# Patient Record
Sex: MALE | Race: WHITE | NOT HISPANIC OR LATINO | Employment: UNEMPLOYED | URBAN - METROPOLITAN AREA
[De-identification: names, ages, dates, MRNs, and addresses within clinical notes are randomized per-mention and may not be internally consistent; named-entity substitution may affect disease eponyms.]

---

## 2024-10-23 ENCOUNTER — HOSPITAL ENCOUNTER (INPATIENT)
Facility: HOSPITAL | Age: 69
LOS: 3 days | Discharge: HOME | DRG: 022 | End: 2024-10-27
Attending: STUDENT IN AN ORGANIZED HEALTH CARE EDUCATION/TRAINING PROGRAM | Admitting: NEUROLOGICAL SURGERY
Payer: MEDICARE

## 2024-10-23 ENCOUNTER — APPOINTMENT (OUTPATIENT)
Dept: CT IMAGING | Facility: HOSPITAL | Age: 69
DRG: 022 | End: 2024-10-23
Payer: MEDICARE

## 2024-10-23 ENCOUNTER — HOSPITAL ENCOUNTER (EMERGENCY)
Facility: HOSPITAL | Age: 69
Discharge: ANOTHER HOSPITAL | DRG: 022 | End: 2024-10-23
Payer: MEDICARE

## 2024-10-23 VITALS
SYSTOLIC BLOOD PRESSURE: 135 MMHG | RESPIRATION RATE: 16 BRPM | WEIGHT: 200 LBS | BODY MASS INDEX: 28 KG/M2 | DIASTOLIC BLOOD PRESSURE: 77 MMHG | HEART RATE: 59 BPM | OXYGEN SATURATION: 95 % | HEIGHT: 71 IN | TEMPERATURE: 97.3 F

## 2024-10-23 DIAGNOSIS — I62.00 SUBDURAL HEMORRHAGE: Primary | ICD-10-CM

## 2024-10-23 PROBLEM — I60.9 SUBARACHNOID HEMORRHAGE: Status: ACTIVE | Noted: 2024-10-23

## 2024-10-23 LAB
ALBUMIN SERPL-MCNC: 4.3 G/DL (ref 3.5–5.2)
ALBUMIN/GLOB SERPL: 1.5 G/DL
ALP SERPL-CCNC: 77 U/L (ref 39–117)
ALT SERPL W P-5'-P-CCNC: 28 U/L (ref 1–41)
ANION GAP SERPL CALCULATED.3IONS-SCNC: 16.2 MMOL/L (ref 5–15)
AST SERPL-CCNC: 24 U/L (ref 1–40)
BASOPHILS # BLD AUTO: 0.05 10*3/MM3 (ref 0–0.2)
BASOPHILS NFR BLD AUTO: 0.3 % (ref 0–1.5)
BILIRUB SERPL-MCNC: 0.5 MG/DL (ref 0–1.2)
BUN SERPL-MCNC: 19 MG/DL (ref 8–23)
BUN/CREAT SERPL: 14.8 (ref 7–25)
CALCIUM SPEC-SCNC: 9.9 MG/DL (ref 8.6–10.5)
CHLORIDE SERPL-SCNC: 104 MMOL/L (ref 98–107)
CO2 SERPL-SCNC: 20.8 MMOL/L (ref 22–29)
CREAT SERPL-MCNC: 1.28 MG/DL (ref 0.76–1.27)
DEPRECATED RDW RBC AUTO: 46.8 FL (ref 37–54)
EGFRCR SERPLBLD CKD-EPI 2021: 60.6 ML/MIN/1.73
EOSINOPHIL # BLD AUTO: 0 10*3/MM3 (ref 0–0.4)
EOSINOPHIL NFR BLD AUTO: 0 % (ref 0.3–6.2)
ERYTHROCYTE [DISTWIDTH] IN BLOOD BY AUTOMATED COUNT: 14.2 % (ref 12.3–15.4)
GEN 5 2HR TROPONIN T REFLEX: 12 NG/L
GLOBULIN UR ELPH-MCNC: 2.9 GM/DL
GLUCOSE BLDC GLUCOMTR-MCNC: 156 MG/DL (ref 70–130)
GLUCOSE SERPL-MCNC: 158 MG/DL (ref 65–99)
HCT VFR BLD AUTO: 45.6 % (ref 37.5–51)
HGB BLD-MCNC: 14.9 G/DL (ref 13–17.7)
IMM GRANULOCYTES # BLD AUTO: 0.1 10*3/MM3 (ref 0–0.05)
IMM GRANULOCYTES NFR BLD AUTO: 0.6 % (ref 0–0.5)
LYMPHOCYTES # BLD AUTO: 0.64 10*3/MM3 (ref 0.7–3.1)
LYMPHOCYTES NFR BLD AUTO: 3.9 % (ref 19.6–45.3)
MCH RBC QN AUTO: 29.4 PG (ref 26.6–33)
MCHC RBC AUTO-ENTMCNC: 32.7 G/DL (ref 31.5–35.7)
MCV RBC AUTO: 89.9 FL (ref 79–97)
MONOCYTES # BLD AUTO: 0.7 10*3/MM3 (ref 0.1–0.9)
MONOCYTES NFR BLD AUTO: 4.3 % (ref 5–12)
NEUTROPHILS NFR BLD AUTO: 14.89 10*3/MM3 (ref 1.7–7)
NEUTROPHILS NFR BLD AUTO: 90.9 % (ref 42.7–76)
NRBC BLD AUTO-RTO: 0 /100 WBC (ref 0–0.2)
PLATELET # BLD AUTO: 260 10*3/MM3 (ref 140–450)
PMV BLD AUTO: 11.9 FL (ref 6–12)
POTASSIUM SERPL-SCNC: 4.6 MMOL/L (ref 3.5–5.2)
PROT SERPL-MCNC: 7.2 G/DL (ref 6–8.5)
QT INTERVAL: 388 MS
QTC INTERVAL: 430 MS
RBC # BLD AUTO: 5.07 10*6/MM3 (ref 4.14–5.8)
SODIUM SERPL-SCNC: 141 MMOL/L (ref 136–145)
TROPONIN T DELTA: 5 NG/L
TROPONIN T SERPL HS-MCNC: 7 NG/L
WBC NRBC COR # BLD AUTO: 16.38 10*3/MM3 (ref 3.4–10.8)

## 2024-10-23 PROCEDURE — 70450 CT HEAD/BRAIN W/O DYE: CPT

## 2024-10-23 PROCEDURE — 84484 ASSAY OF TROPONIN QUANT: CPT | Performed by: STUDENT IN AN ORGANIZED HEALTH CARE EDUCATION/TRAINING PROGRAM

## 2024-10-23 PROCEDURE — G0378 HOSPITAL OBSERVATION PER HR: HCPCS

## 2024-10-23 PROCEDURE — 93005 ELECTROCARDIOGRAM TRACING: CPT | Performed by: STUDENT IN AN ORGANIZED HEALTH CARE EDUCATION/TRAINING PROGRAM

## 2024-10-23 PROCEDURE — 63710000001 DEXAMETHASONE PER 0.25 MG: Performed by: NEUROLOGICAL SURGERY

## 2024-10-23 PROCEDURE — 82948 REAGENT STRIP/BLOOD GLUCOSE: CPT

## 2024-10-23 PROCEDURE — 99285 EMERGENCY DEPT VISIT HI MDM: CPT

## 2024-10-23 PROCEDURE — 70486 CT MAXILLOFACIAL W/O DYE: CPT | Performed by: RADIOLOGY

## 2024-10-23 PROCEDURE — 80053 COMPREHEN METABOLIC PANEL: CPT

## 2024-10-23 PROCEDURE — 36415 COLL VENOUS BLD VENIPUNCTURE: CPT

## 2024-10-23 PROCEDURE — 85025 COMPLETE CBC W/AUTO DIFF WBC: CPT

## 2024-10-23 PROCEDURE — 99223 1ST HOSP IP/OBS HIGH 75: CPT

## 2024-10-23 PROCEDURE — 70450 CT HEAD/BRAIN W/O DYE: CPT | Performed by: RADIOLOGY

## 2024-10-23 PROCEDURE — 70486 CT MAXILLOFACIAL W/O DYE: CPT

## 2024-10-23 RX ORDER — LEVETIRACETAM 250 MG/1
500 TABLET ORAL 2 TIMES DAILY
Status: DISCONTINUED | OUTPATIENT
Start: 2024-10-23 | End: 2024-10-23 | Stop reason: HOSPADM

## 2024-10-23 RX ORDER — DEXAMETHASONE 4 MG/1
4 TABLET ORAL EVERY 6 HOURS SCHEDULED
Status: DISCONTINUED | OUTPATIENT
Start: 2024-10-23 | End: 2024-10-23 | Stop reason: HOSPADM

## 2024-10-23 RX ORDER — KETOROLAC TROMETHAMINE 30 MG/ML
15 INJECTION, SOLUTION INTRAMUSCULAR; INTRAVENOUS ONCE
Status: DISCONTINUED | OUTPATIENT
Start: 2024-10-23 | End: 2024-10-23

## 2024-10-23 RX ADMIN — LEVETIRACETAM 500 MG: 250 TABLET, FILM COATED ORAL at 20:08

## 2024-10-23 RX ADMIN — DEXAMETHASONE 4 MG: 4 TABLET ORAL at 20:08

## 2024-10-24 PROBLEM — I60.9 SUBARACHNOID HEMORRHAGE: Status: RESOLVED | Noted: 2024-10-23 | Resolved: 2024-10-24

## 2024-10-24 PROBLEM — R53.1 GENERALIZED WEAKNESS: Status: ACTIVE | Noted: 2024-10-24

## 2024-10-24 PROBLEM — I25.83 CORONARY ATHEROSCLEROSIS DUE TO LIPID RICH PLAQUE: Status: ACTIVE | Noted: 2024-10-24

## 2024-10-24 PROBLEM — I62.00 SUBDURAL HEMATOMA, NONTRAUMATIC: Status: ACTIVE | Noted: 2024-10-24

## 2024-10-24 PROBLEM — I60.9 SUBARACHNOID HEMORRHAGE: Status: ACTIVE | Noted: 2024-10-24

## 2024-10-24 PROBLEM — E78.5 HLD (HYPERLIPIDEMIA): Status: ACTIVE | Noted: 2024-10-24

## 2024-10-24 PROBLEM — E11.9 TYPE 2 DIABETES MELLITUS, WITHOUT LONG-TERM CURRENT USE OF INSULIN: Status: ACTIVE | Noted: 2024-10-24

## 2024-10-24 PROBLEM — I10 HTN (HYPERTENSION): Status: ACTIVE | Noted: 2024-10-24

## 2024-10-24 LAB
ALBUMIN SERPL-MCNC: 4.1 G/DL (ref 3.5–5.2)
ALBUMIN/GLOB SERPL: 1.5 G/DL
ALP SERPL-CCNC: 72 U/L (ref 39–117)
ALT SERPL W P-5'-P-CCNC: 25 U/L (ref 1–41)
ANION GAP SERPL CALCULATED.3IONS-SCNC: 13 MMOL/L (ref 5–15)
AST SERPL-CCNC: 22 U/L (ref 1–40)
BASOPHILS # BLD AUTO: 0.01 10*3/MM3 (ref 0–0.2)
BASOPHILS NFR BLD AUTO: 0.1 % (ref 0–1.5)
BILIRUB SERPL-MCNC: 0.6 MG/DL (ref 0–1.2)
BUN SERPL-MCNC: 23 MG/DL (ref 8–23)
BUN/CREAT SERPL: 20.2 (ref 7–25)
CALCIUM SPEC-SCNC: 10.4 MG/DL (ref 8.6–10.5)
CHLORIDE SERPL-SCNC: 104 MMOL/L (ref 98–107)
CO2 SERPL-SCNC: 24 MMOL/L (ref 22–29)
CREAT SERPL-MCNC: 1.14 MG/DL (ref 0.76–1.27)
DEPRECATED RDW RBC AUTO: 45.4 FL (ref 37–54)
EGFRCR SERPLBLD CKD-EPI 2021: 69.6 ML/MIN/1.73
EOSINOPHIL # BLD AUTO: 0 10*3/MM3 (ref 0–0.4)
EOSINOPHIL NFR BLD AUTO: 0 % (ref 0.3–6.2)
ERYTHROCYTE [DISTWIDTH] IN BLOOD BY AUTOMATED COUNT: 14.2 % (ref 12.3–15.4)
GLOBULIN UR ELPH-MCNC: 2.7 GM/DL
GLUCOSE BLDC GLUCOMTR-MCNC: 113 MG/DL (ref 70–130)
GLUCOSE BLDC GLUCOMTR-MCNC: 125 MG/DL (ref 70–130)
GLUCOSE BLDC GLUCOMTR-MCNC: 140 MG/DL (ref 70–130)
GLUCOSE BLDC GLUCOMTR-MCNC: 146 MG/DL (ref 70–130)
GLUCOSE SERPL-MCNC: 102 MG/DL (ref 65–99)
HBA1C MFR BLD: 5.7 % (ref 4.8–5.6)
HCT VFR BLD AUTO: 42.6 % (ref 37.5–51)
HGB BLD-MCNC: 14.6 G/DL (ref 13–17.7)
IMM GRANULOCYTES # BLD AUTO: 0.06 10*3/MM3 (ref 0–0.05)
IMM GRANULOCYTES NFR BLD AUTO: 0.5 % (ref 0–0.5)
INR PPP: 1.06 (ref 0.89–1.12)
LYMPHOCYTES # BLD AUTO: 1.14 10*3/MM3 (ref 0.7–3.1)
LYMPHOCYTES NFR BLD AUTO: 8.8 % (ref 19.6–45.3)
MCH RBC QN AUTO: 30 PG (ref 26.6–33)
MCHC RBC AUTO-ENTMCNC: 34.3 G/DL (ref 31.5–35.7)
MCV RBC AUTO: 87.5 FL (ref 79–97)
MONOCYTES # BLD AUTO: 0.67 10*3/MM3 (ref 0.1–0.9)
MONOCYTES NFR BLD AUTO: 5.2 % (ref 5–12)
NEUTROPHILS NFR BLD AUTO: 11.07 10*3/MM3 (ref 1.7–7)
NEUTROPHILS NFR BLD AUTO: 85.4 % (ref 42.7–76)
NRBC BLD AUTO-RTO: 0 /100 WBC (ref 0–0.2)
PLATELET # BLD AUTO: 259 10*3/MM3 (ref 140–450)
PMV BLD AUTO: 12 FL (ref 6–12)
POTASSIUM SERPL-SCNC: 5.1 MMOL/L (ref 3.5–5.2)
PROT SERPL-MCNC: 6.8 G/DL (ref 6–8.5)
PROTHROMBIN TIME: 13.9 SECONDS (ref 12.2–14.5)
RBC # BLD AUTO: 4.87 10*6/MM3 (ref 4.14–5.8)
SODIUM SERPL-SCNC: 141 MMOL/L (ref 136–145)
WBC NRBC COR # BLD AUTO: 12.95 10*3/MM3 (ref 3.4–10.8)

## 2024-10-24 PROCEDURE — C1760 CLOSURE DEV, VASC: HCPCS | Performed by: NEUROLOGICAL SURGERY

## 2024-10-24 PROCEDURE — 82948 REAGENT STRIP/BLOOD GLUCOSE: CPT

## 2024-10-24 PROCEDURE — C1769 GUIDE WIRE: HCPCS | Performed by: NEUROLOGICAL SURGERY

## 2024-10-24 PROCEDURE — 80053 COMPREHEN METABOLIC PANEL: CPT

## 2024-10-24 PROCEDURE — 99232 SBSQ HOSP IP/OBS MODERATE 35: CPT | Performed by: STUDENT IN AN ORGANIZED HEALTH CARE EDUCATION/TRAINING PROGRAM

## 2024-10-24 PROCEDURE — B31R1ZZ FLUOROSCOPY OF INTRACRANIAL ARTERIES USING LOW OSMOLAR CONTRAST: ICD-10-PCS | Performed by: NEUROLOGICAL SURGERY

## 2024-10-24 PROCEDURE — 03VG3DZ RESTRICTION OF INTRACRANIAL ARTERY WITH INTRALUMINAL DEVICE, PERCUTANEOUS APPROACH: ICD-10-PCS | Performed by: NEUROLOGICAL SURGERY

## 2024-10-24 PROCEDURE — 83036 HEMOGLOBIN GLYCOSYLATED A1C: CPT

## 2024-10-24 PROCEDURE — C1889 IMPLANT/INSERT DEVICE, NOC: HCPCS | Performed by: NEUROLOGICAL SURGERY

## 2024-10-24 PROCEDURE — C1887 CATHETER, GUIDING: HCPCS | Performed by: NEUROLOGICAL SURGERY

## 2024-10-24 PROCEDURE — 25010000002 LIDOCAINE PF 1% 1 % SOLUTION: Performed by: NEUROLOGICAL SURGERY

## 2024-10-24 PROCEDURE — B3121ZZ FLUOROSCOPY OF LEFT SUBCLAVIAN ARTERY USING LOW OSMOLAR CONTRAST: ICD-10-PCS | Performed by: NEUROLOGICAL SURGERY

## 2024-10-24 PROCEDURE — B3151ZZ FLUOROSCOPY OF BILATERAL COMMON CAROTID ARTERIES USING LOW OSMOLAR CONTRAST: ICD-10-PCS | Performed by: NEUROLOGICAL SURGERY

## 2024-10-24 PROCEDURE — 99152 MOD SED SAME PHYS/QHP 5/>YRS: CPT | Performed by: NEUROLOGICAL SURGERY

## 2024-10-24 PROCEDURE — 75894 X-RAYS TRANSCATH THERAPY: CPT | Performed by: NEUROLOGICAL SURGERY

## 2024-10-24 PROCEDURE — B3191ZZ FLUOROSCOPY OF RIGHT EXTERNAL CAROTID ARTERY USING LOW OSMOLAR CONTRAST: ICD-10-PCS | Performed by: NEUROLOGICAL SURGERY

## 2024-10-24 PROCEDURE — C1894 INTRO/SHEATH, NON-LASER: HCPCS | Performed by: NEUROLOGICAL SURGERY

## 2024-10-24 PROCEDURE — 61624 TCAT PERM OCCLS/EMBOLJ CNS: CPT | Performed by: NEUROLOGICAL SURGERY

## 2024-10-24 PROCEDURE — 25010000002 MIDAZOLAM PER 1 MG: Performed by: NEUROLOGICAL SURGERY

## 2024-10-24 PROCEDURE — 0 IODIXANOL PER 1 ML: Performed by: NEUROLOGICAL SURGERY

## 2024-10-24 PROCEDURE — 25010000002 FENTANYL CITRATE (PF) 50 MCG/ML SOLUTION: Performed by: NEUROLOGICAL SURGERY

## 2024-10-24 PROCEDURE — 85610 PROTHROMBIN TIME: CPT

## 2024-10-24 PROCEDURE — B3171ZZ FLUOROSCOPY OF LEFT INTERNAL CAROTID ARTERY USING LOW OSMOLAR CONTRAST: ICD-10-PCS | Performed by: NEUROLOGICAL SURGERY

## 2024-10-24 PROCEDURE — 99153 MOD SED SAME PHYS/QHP EA: CPT | Performed by: NEUROLOGICAL SURGERY

## 2024-10-24 PROCEDURE — 85025 COMPLETE CBC W/AUTO DIFF WBC: CPT

## 2024-10-24 PROCEDURE — 75898 FOLLOW-UP ANGIOGRAPHY: CPT | Performed by: NEUROLOGICAL SURGERY

## 2024-10-24 DEVICE — 360 NANO DETACHABLE COIL
Type: IMPLANTABLE DEVICE | Status: FUNCTIONAL
Brand: TARGET

## 2024-10-24 DEVICE — PARTICL EMB CONTRL PVA 250/355MH BX/5: Type: IMPLANTABLE DEVICE | Status: FUNCTIONAL

## 2024-10-24 RX ORDER — BISACODYL 5 MG/1
5 TABLET, DELAYED RELEASE ORAL DAILY PRN
Status: DISCONTINUED | OUTPATIENT
Start: 2024-10-24 | End: 2024-10-27 | Stop reason: HOSPADM

## 2024-10-24 RX ORDER — ONDANSETRON 2 MG/ML
4 INJECTION INTRAMUSCULAR; INTRAVENOUS EVERY 6 HOURS PRN
Status: DISCONTINUED | OUTPATIENT
Start: 2024-10-24 | End: 2024-10-27 | Stop reason: HOSPADM

## 2024-10-24 RX ORDER — LIDOCAINE HYDROCHLORIDE 10 MG/ML
INJECTION, SOLUTION EPIDURAL; INFILTRATION; INTRACAUDAL; PERINEURAL
Status: DISCONTINUED | OUTPATIENT
Start: 2024-10-24 | End: 2024-10-24 | Stop reason: HOSPADM

## 2024-10-24 RX ORDER — SODIUM CHLORIDE 9 MG/ML
75 INJECTION, SOLUTION INTRAVENOUS CONTINUOUS
Status: ACTIVE | OUTPATIENT
Start: 2024-10-24 | End: 2024-10-24

## 2024-10-24 RX ORDER — ISOSORBIDE MONONITRATE 30 MG/1
30 TABLET, EXTENDED RELEASE ORAL DAILY
Status: DISCONTINUED | OUTPATIENT
Start: 2024-10-24 | End: 2024-10-27 | Stop reason: HOSPADM

## 2024-10-24 RX ORDER — NITROGLYCERIN 0.4 MG/1
0.4 TABLET SUBLINGUAL
Status: DISCONTINUED | OUTPATIENT
Start: 2024-10-24 | End: 2024-10-27 | Stop reason: HOSPADM

## 2024-10-24 RX ORDER — LISINOPRIL 5 MG/1
5 TABLET ORAL DAILY
Status: DISCONTINUED | OUTPATIENT
Start: 2024-10-24 | End: 2024-10-24

## 2024-10-24 RX ORDER — LISINOPRIL 5 MG/1
2.5 TABLET ORAL DAILY
COMMUNITY
End: 2024-10-27 | Stop reason: HOSPADM

## 2024-10-24 RX ORDER — CLOPIDOGREL BISULFATE 75 MG/1
75 TABLET ORAL DAILY
COMMUNITY
End: 2024-10-27 | Stop reason: HOSPADM

## 2024-10-24 RX ORDER — AMLODIPINE BESYLATE 10 MG/1
10 TABLET ORAL DAILY
Status: DISCONTINUED | OUTPATIENT
Start: 2024-10-24 | End: 2024-10-27 | Stop reason: HOSPADM

## 2024-10-24 RX ORDER — LISINOPRIL 5 MG/1
2.5 TABLET ORAL DAILY
Status: DISCONTINUED | OUTPATIENT
Start: 2024-10-24 | End: 2024-10-24

## 2024-10-24 RX ORDER — ACETAMINOPHEN 325 MG/1
650 TABLET ORAL EVERY 4 HOURS PRN
Status: DISCONTINUED | OUTPATIENT
Start: 2024-10-24 | End: 2024-10-24 | Stop reason: SDUPTHER

## 2024-10-24 RX ORDER — SODIUM CHLORIDE 0.9 % (FLUSH) 0.9 %
10 SYRINGE (ML) INJECTION AS NEEDED
Status: DISCONTINUED | OUTPATIENT
Start: 2024-10-24 | End: 2024-10-27 | Stop reason: HOSPADM

## 2024-10-24 RX ORDER — ACETAMINOPHEN 160 MG/5ML
650 SOLUTION ORAL EVERY 4 HOURS PRN
Status: DISCONTINUED | OUTPATIENT
Start: 2024-10-24 | End: 2024-10-24 | Stop reason: SDUPTHER

## 2024-10-24 RX ORDER — ATORVASTATIN CALCIUM 40 MG/1
80 TABLET, FILM COATED ORAL DAILY
Status: DISCONTINUED | OUTPATIENT
Start: 2024-10-24 | End: 2024-10-27 | Stop reason: HOSPADM

## 2024-10-24 RX ORDER — IBUPROFEN 600 MG/1
1 TABLET ORAL
Status: DISCONTINUED | OUTPATIENT
Start: 2024-10-24 | End: 2024-10-27 | Stop reason: HOSPADM

## 2024-10-24 RX ORDER — POLYETHYLENE GLYCOL 3350 17 G/17G
17 POWDER, FOR SOLUTION ORAL DAILY PRN
Status: DISCONTINUED | OUTPATIENT
Start: 2024-10-24 | End: 2024-10-27 | Stop reason: HOSPADM

## 2024-10-24 RX ORDER — MIDAZOLAM HYDROCHLORIDE 1 MG/ML
INJECTION, SOLUTION INTRAMUSCULAR; INTRAVENOUS
Status: DISCONTINUED | OUTPATIENT
Start: 2024-10-24 | End: 2024-10-24 | Stop reason: HOSPADM

## 2024-10-24 RX ORDER — FLUTICASONE PROPIONATE 50 MCG
2 SPRAY, SUSPENSION (ML) NASAL DAILY
COMMUNITY

## 2024-10-24 RX ORDER — SODIUM CHLORIDE 0.9 % (FLUSH) 0.9 %
10 SYRINGE (ML) INJECTION EVERY 12 HOURS SCHEDULED
Status: DISCONTINUED | OUTPATIENT
Start: 2024-10-24 | End: 2024-10-27 | Stop reason: HOSPADM

## 2024-10-24 RX ORDER — AMLODIPINE BESYLATE 10 MG/1
10 TABLET ORAL DAILY
COMMUNITY
End: 2024-11-08 | Stop reason: HOSPADM

## 2024-10-24 RX ORDER — ATORVASTATIN CALCIUM 20 MG/1
20 TABLET, FILM COATED ORAL DAILY
Status: DISCONTINUED | OUTPATIENT
Start: 2024-10-24 | End: 2024-10-24

## 2024-10-24 RX ORDER — LOSARTAN POTASSIUM 50 MG/1
50 TABLET ORAL DAILY
Status: ON HOLD | COMMUNITY
End: 2024-10-24

## 2024-10-24 RX ORDER — FENTANYL CITRATE 50 UG/ML
INJECTION, SOLUTION INTRAMUSCULAR; INTRAVENOUS
Status: DISCONTINUED | OUTPATIENT
Start: 2024-10-24 | End: 2024-10-24 | Stop reason: HOSPADM

## 2024-10-24 RX ORDER — CLOPIDOGREL BISULFATE 75 MG/1
75 TABLET ORAL DAILY
Status: DISCONTINUED | OUTPATIENT
Start: 2024-10-24 | End: 2024-10-26

## 2024-10-24 RX ORDER — NICOTINE POLACRILEX 4 MG
15 LOZENGE BUCCAL
Status: DISCONTINUED | OUTPATIENT
Start: 2024-10-24 | End: 2024-10-27 | Stop reason: HOSPADM

## 2024-10-24 RX ORDER — ACETAMINOPHEN 650 MG/1
650 SUPPOSITORY RECTAL EVERY 4 HOURS PRN
Status: DISCONTINUED | OUTPATIENT
Start: 2024-10-24 | End: 2024-10-24 | Stop reason: SDUPTHER

## 2024-10-24 RX ORDER — ASPIRIN 81 MG/1
81 TABLET ORAL DAILY
Status: DISCONTINUED | OUTPATIENT
Start: 2024-10-24 | End: 2024-10-26

## 2024-10-24 RX ORDER — AMOXICILLIN 250 MG
2 CAPSULE ORAL 2 TIMES DAILY PRN
Status: DISCONTINUED | OUTPATIENT
Start: 2024-10-24 | End: 2024-10-27 | Stop reason: HOSPADM

## 2024-10-24 RX ORDER — LANOLIN ALCOHOL/MO/W.PET/CERES
1000 CREAM (GRAM) TOPICAL DAILY
COMMUNITY

## 2024-10-24 RX ORDER — DEXTROSE MONOHYDRATE 25 G/50ML
25 INJECTION, SOLUTION INTRAVENOUS
Status: DISCONTINUED | OUTPATIENT
Start: 2024-10-24 | End: 2024-10-27 | Stop reason: HOSPADM

## 2024-10-24 RX ORDER — FOLIC ACID 1 MG/1
1 TABLET ORAL DAILY
COMMUNITY

## 2024-10-24 RX ORDER — ISOSORBIDE MONONITRATE 30 MG/1
30 TABLET, EXTENDED RELEASE ORAL DAILY
COMMUNITY

## 2024-10-24 RX ORDER — BISACODYL 10 MG
10 SUPPOSITORY, RECTAL RECTAL DAILY PRN
Status: DISCONTINUED | OUTPATIENT
Start: 2024-10-24 | End: 2024-10-27 | Stop reason: HOSPADM

## 2024-10-24 RX ORDER — ONDANSETRON 4 MG/1
4 TABLET, ORALLY DISINTEGRATING ORAL EVERY 6 HOURS PRN
Status: DISCONTINUED | OUTPATIENT
Start: 2024-10-24 | End: 2024-10-27 | Stop reason: HOSPADM

## 2024-10-24 RX ORDER — LISINOPRIL 5 MG/1
5 TABLET ORAL DAILY
Status: DISCONTINUED | OUTPATIENT
Start: 2024-10-24 | End: 2024-10-27 | Stop reason: HOSPADM

## 2024-10-24 RX ORDER — IODIXANOL 320 MG/ML
INJECTION, SOLUTION INTRAVASCULAR
Status: DISCONTINUED | OUTPATIENT
Start: 2024-10-24 | End: 2024-10-24 | Stop reason: HOSPADM

## 2024-10-24 RX ORDER — DAPAGLIFLOZIN 10 MG/1
10 TABLET, FILM COATED ORAL DAILY
COMMUNITY

## 2024-10-24 RX ORDER — ACETAMINOPHEN 500 MG
1000 TABLET ORAL 3 TIMES DAILY
Status: DISPENSED | OUTPATIENT
Start: 2024-10-24 | End: 2024-10-26

## 2024-10-24 RX ORDER — ASPIRIN 81 MG/1
81 TABLET ORAL DAILY
Status: ON HOLD | COMMUNITY
End: 2024-10-27

## 2024-10-24 RX ORDER — ATORVASTATIN CALCIUM 20 MG/1
80 TABLET, FILM COATED ORAL DAILY
COMMUNITY
End: 2024-11-08 | Stop reason: HOSPADM

## 2024-10-24 RX ORDER — LOSARTAN POTASSIUM 50 MG/1
50 TABLET ORAL
Status: DISCONTINUED | OUTPATIENT
Start: 2024-10-24 | End: 2024-10-24

## 2024-10-24 RX ADMIN — ACETAMINOPHEN 1000 MG: 500 TABLET ORAL at 17:27

## 2024-10-24 RX ADMIN — ACETAMINOPHEN 1000 MG: 500 TABLET ORAL at 21:13

## 2024-10-24 RX ADMIN — Medication 10 ML: at 10:05

## 2024-10-24 NOTE — ED NOTES
Call report to [phone] [facility]. When report is given they will give the bed number. If pt comes by EMS tell them to go to the [facility].

## 2024-10-24 NOTE — PLAN OF CARE
Goal Outcome Evaluation:         Patient Transfered to floor from PACU post MMA embolization, right groin site soft, with gauze and tegaderm no sign of bleeding, VSS, room air, family at bedside. Schedule pain med admin, only complain of slight pain when sneezing. Call light within reach.

## 2024-10-24 NOTE — PROGRESS NOTES
This is a 69-year-old man with a very large right sided subdural and a smaller left sided convexity subdural who presented with intermittent headaches over the last 3 weeks.  He went to [facility] and was evaluated with a head CT.  This demonstrated the aforementioned findings.    Informed consent for diagnostic angiogram with planned bilateral middle meningeal artery embolizations was obtained from the patient.  His niece and wife were at the bedside.  All questions were answered.  No guarantees were given or implied.  The patient consents to proceed with surgery.    Formal consultation to follow.

## 2024-10-24 NOTE — CASE MANAGEMENT/SOCIAL WORK
Discharge Planning Assessment  [facility]     Patient Name: Manjit Thomas  MRN: [id number]  Today's Date: 10/24/2024    Admit Date: 10/23/2024    Plan: Home   Discharge Needs Assessment       Row Name 10/24/24 1135       Living Environment    People in Home spouse    Current Living Arrangements home    Primary Care Provided by self    Provides Primary Care For no one    Family Caregiver if Needed spouse    Family Caregiver Names Yonny Thomas    Quality of Family Relationships supportive    Able to Return to Prior Arrangements yes       Transition Planning    Patient/Family Anticipates Transition to home with family    Transportation Anticipated family or friend will provide       Discharge Needs Assessment    Readmission Within the Last 30 Days no previous admission in last 30 days    Equipment Currently Used at Home none                   Discharge Plan       Row Name 10/24/24 1143       Plan    Plan Home    Patient/Family in Agreement with Plan yes    Plan Comments Spoke with Mr. Thomas and family at the bedside. He lives with his Spouse in [facility]. He is independent with ADL's and he does not use any DME or Home Health. He does not have advance directives. His PCP is Ariel Lee and He has [employer] Medicare and [place] Medicaid insurance. Discharge plan is home. CM will continue to follow for discharge needs.    Final Discharge Disposition Code 01 - home or self-care                  Continued Care and Services - Admitted Since 10/23/2024    No active coordination exists for this encounter.          Demographic Summary       Row Name 10/24/24 1129       General Information    Admission Type inpatient    Arrived From home    Referral Source admission list    Reason for Consult discharge planning    Preferred Language English       Contact Information    Permission Granted to Share Info With                    Functional Status       Row Name 10/24/24 1132       Functional Status, IADL     Medications independent    Meal Preparation independent    Housekeeping independent    Laundry independent    Shopping independent       Mental Status    General Appearance WDL WDL                   Psychosocial    No documentation.                  Abuse/Neglect    No documentation.                  Legal    No documentation.                  Substance Abuse    No documentation.                  Patient Forms    No documentation.                     Janina Moore RN

## 2024-10-24 NOTE — PROGRESS NOTES
[facility] Medicine Services  PROGRESS NOTE    Patient Name: Manjit Thomas  : 1955  MRN: [id number]    Date of Admission: 10/23/2024  Primary Care Physician: Ariel Lee DO    Subjective   Subjective     CC:  Bilateral subdural hematoma    HPI:  Patient seen resting comfortably in bed no acute distress.  Patient states he been having headaches for the past 3 weeks, exacerbated by laying down, alleviated with sitting up.  Patient denied any syncope, or loss of consciousness.  Patient states he had multiple episodes of nausea and vomiting yesterday that prompted him to be evaluated at the ED.  Currently.  Patient denies any headaches, nausea or vomiting.      Objective   Objective     Vital Signs:   Temp:  [97.3 °F (36.3 °C)-98.4 °F (36.9 °C)] 97.9 °F (36.6 °C)  Heart Rate:  [50-81] 63  Resp:  [16-18] 16  BP: (103-182)/(68-95) 182/92  Flow (L/min) (Oxygen Therapy):  [2] 2     Physical Exam  Constitutional:       General: He is not in acute distress.  Eyes:      General: No scleral icterus.  Cardiovascular:      Rate and Rhythm: Normal rate.      Pulses: Normal pulses.   Pulmonary:      Effort: Pulmonary effort is normal. No respiratory distress.   Abdominal:      General: There is no distension.      Palpations: Abdomen is soft.      Tenderness: There is no abdominal tenderness. There is no guarding or rebound.   Musculoskeletal:      Right lower leg: No edema.      Left lower leg: No edema.   Neurological:      Mental Status: He is alert and oriented to person, place, and time.      Cranial Nerves: No cranial nerve deficit.      Sensory: No sensory deficit.      Motor: No weakness.   Psychiatric:         Mood and Affect: Mood normal.         Behavior: Behavior normal.          Results Reviewed:  LAB RESULTS:      Lab 10/24/24  0808 10/24/24  0807 10/23/24  2012 10/23/24  1853 10/23/24  1746 10/18/24  0916   WBC  --  12.95*  --  16.38*  --  7.7   HEMOGLOBIN  --  14.6  --   14.9  --  15.7   HEMATOCRIT  --  42.6  --  45.6  --  48.3   PLATELETS  --  259  --  260  --  260   NEUTROS ABS  --  11.07*  --  14.89*  --  5.0   IMMATURE GRANS (ABS)  --  0.06*  --  0.10*  --   --    LYMPHS ABS  --  1.14  --  0.64*  --  1.7   MONOS ABS  --  0.67  --  0.70  --  0.8   EOS ABS  --  0.00  --  0.00  --  0.2   MCV  --  87.5  --  89.9  --  91   PROTIME 13.9  --   --   --   --   --    HSTROP T  --   --  12  --  7  --          Lab 10/24/24  0808 10/23/24  1746   SODIUM 141 141   POTASSIUM 5.1 4.6   CHLORIDE 104 104   CO2 24.0 20.8*   ANION GAP 13.0 16.2*   BUN 23 19   CREATININE 1.14 1.28*   EGFR 69.6 60.6   GLUCOSE 102* 158*   CALCIUM 10.4 9.9   HEMOGLOBIN A1C 5.70*  --          Lab 10/24/24  0808 10/23/24  1746   TOTAL PROTEIN 6.8 7.2   ALBUMIN 4.1 4.3   GLOBULIN 2.7 2.9   ALT (SGPT) 25 28   AST (SGOT) 22 24   BILIRUBIN 0.6 0.5   ALK PHOS 72 77         Lab 10/24/24  0808 10/23/24  2012 10/23/24  1746   HSTROP T  --  12 7   PROTIME 13.9  --   --    INR 1.06  --   --                  Brief Urine Lab Results       None            Microbiology Results Abnormal       None            CT Head Without Contrast    Result Date: 10/23/2024  INDICATION: Headache.  COMPARISON: No relevant priors available  TECHNIQUE: Axial CT images of the head and sinuses were obtained without IV contrast. Coronal and sagittal reformations were reviewed.  FINDINGS: Acute on chronic right subdural hematoma measuring 1.7 cm maximum thickness. Acute on chronic left-sided subdural hematoma measuring up to 0.5 cm. Right-to-left midline shift 1 cm.  Gray-white differentiation is relatively preserved. No evidence of acute large territorial infarction. Ventricles appear normal in size. Basal cisterns are patent. No depressed calvarial fracture.  Paranasal sinuses are well aerated. No air-fluid level. Rightward nasal septum deviation. Orbits and globes appear unremarkable.      Impression: 1. Acute on chronic right subdural hematoma measuring  1.7 cm maximum thickness. Acute on chronic left-sided subdural hematoma measuring up to 0.5 cm. Right-to-left midline shift 1 cm. 2. Sinuses appear unremarkable.  Report called to Dr. Serrano at 347PM EST 10/23/2024.   This report was finalized on 10/23/2024 6:50 PM by Alex Pallas, DO.      CT Sinus Without Contrast    Result Date: 10/23/2024  INDICATION: Headache.  COMPARISON: No relevant priors available  TECHNIQUE: Axial CT images of the head and sinuses were obtained without IV contrast. Coronal and sagittal reformations were reviewed.  FINDINGS: Acute on chronic right subdural hematoma measuring 1.7 cm maximum thickness. Acute on chronic left-sided subdural hematoma measuring up to 0.5 cm. Right-to-left midline shift 1 cm.  Gray-white differentiation is relatively preserved. No evidence of acute large territorial infarction. Ventricles appear normal in size. Basal cisterns are patent. No depressed calvarial fracture.  Paranasal sinuses are well aerated. No air-fluid level. Rightward nasal septum deviation. Orbits and globes appear unremarkable.      Impression: 1. Acute on chronic right subdural hematoma measuring 1.7 cm maximum thickness. Acute on chronic left-sided subdural hematoma measuring up to 0.5 cm. Right-to-left midline shift 1 cm. 2. Sinuses appear unremarkable.  Report called to Dr. Serrano at 347PM EST 10/23/2024.   This report was finalized on 10/23/2024 6:50 PM by Alex Pallas, DO.           Current medications:  Scheduled Meds:acetaminophen, 1,000 mg, Oral, TID  amLODIPine, 10 mg, Oral, Daily  aspirin, 81 mg, Oral, Daily  [Transfer Hold] atorvastatin, 80 mg, Oral, Daily  [Held by provider] clopidogrel, 75 mg, Oral, Daily  insulin regular, 2-7 Units, Subcutaneous, Q6H  isosorbide mononitrate, 30 mg, Oral, Daily  lisinopril, 5 mg, Oral, Daily  sodium chloride, 10 mL, Intravenous, Q12H      Continuous Infusions:   PRN Meds:.  senna-docusate sodium **AND** polyethylene glycol **AND** bisacodyl  **AND** bisacodyl    Calcium Replacement - Follow Nurse / BPA Driven Protocol    dextrose    dextrose    fentaNYL citrate (PF)    glucagon (human recombinant)    lidocaine PF 1%    Magnesium Standard Dose Replacement - Follow Nurse / BPA Driven Protocol    melatonin    midazolam    ondansetron ODT **OR** ondansetron    Phosphorus Replacement - Follow Nurse / BPA Driven Protocol    Potassium Replacement - Follow Nurse / BPA Driven Protocol    sodium chloride    Assessment & Plan   Assessment & Plan     Active Hospital Problems    Diagnosis  POA    **Subdural hematoma, nontraumatic [I62.00]  Yes    Type 2 diabetes mellitus, without long-term current use of insulin [E11.9]  Unknown    HLD (hyperlipidemia) [E78.5]  Unknown    HTN (hypertension) [I10]  Unknown    Coronary atherosclerosis due to lipid rich plaque [I25.83]  Unknown    Generalized weakness [R53.1]  Unknown    Subarachnoid hemorrhage [I60.9]  Yes      Resolved Hospital Problems   No resolved problems to display.        Brief Hospital Course to date:  Manjit Thomas is a 69 y.o. male  with a PMHx HTN, HLD, coronary atherosclerosis, s/p angioplasty with stent on Plavix, nephrolithiasis, and vitamin B12 deficiency presenting with acute on chronic bilateral subdural hematoma.     Headache  Acute on chronic bilateral subdural hematoma  - 3 weeks of headache, 1 day of nausea/vomiting. Denies any trauma/falls.   - CT head: Acute on chronic right subdural hematoma measuring 1.7 cm maximum  thickness. Acute on chronic left-sided subdural hematoma measuring up to 0.5 cm. Right-to-left midline shift 1 cm.  PLAN  - NSGY consulted  - Planning for diagnostic angiogram with planned bilateral middle meningeal artery embolizations today  - Will remain NPO  - Neuro checks   - Monitor Hgb with CBC     Generalized weakness  -Fall risk  -PT/ OT consulted     HTN  Coronary atherosclerosis  -Troponin initial 7, repeat 12  -EKG sinus rhythm with PVCs  -Patient denies chest  pain  PLAN  -Continue aspirin  -Holding Plavix in setting of subdural hematomas   -Holding isosorbide, lisinopril, and amlodipine until post-op    HLD  -Holding atorvastatin until post-op     Type 2 diabetes without long-term insulin use  -Patient on Farxiga  -A1c 5.70  -FSBG, SSI.    Expected Discharge Location and Transportation: TBD  Expected Discharge   Expected discharge date/ time has not been documented.     VTE Prophylaxis:  Mechanical VTE prophylaxis orders are present.         AM-PAC 6 Clicks Score (PT): 18 (10/23/24 6064)    CODE STATUS:   Code Status and Medical Interventions: CPR (Attempt to Resuscitate); Full Support   Ordered at: 10/24/24 0054     Level Of Support Discussed With:    Patient     Code Status (Patient has no pulse and is not breathing):    CPR (Attempt to Resuscitate)     Medical Interventions (Patient has pulse or is breathing):    Full Support       Cristo Robert DO  10/24/24

## 2024-10-24 NOTE — CONSULTS
Reason for consultation: Bilateral subdural hematoma    Referring Physician:  David Serrano DO     Chief complaint Headache, generalized weakness     Admit Diagnosis:   Subarachnoid hemorrhage [I60.9]     History of present illness:  This is a 69-year-old male who presented to [facility] yesterday with an approximately 3-week history of progressively worsening headaches.  Head CT at [facility] demonstrated a very large right-sided subdural and a smaller left-sided convexity subdural hematoma.  He was ultimately transferred to [facility] for higher level care and admitted to hospital medicine.  Neurosurgical consultation was ultimately requested.  He is currently on Plavix due to history of angioplasty with stent placements.  Patient was seen and examined at the bedside this morning.  He denies headache this morning and states that he is feeling better.  He denies any numbness/tingling or other neurological symptoms.  His wife and niece at the bedside this morning.    ROS:  A 12 point review of systems was performed.  All systems reviewed were negative with the exception of those mentioned in the history of present illness.    Past medical history:  Past Medical History:   Diagnosis Date    Coronary atherosclerosis     Diabetes mellitus     Headache     HLD (hyperlipidemia)     Hypertension     Vitamin B12 deficiency        Past surgical history:  Past Surgical History:   Procedure Laterality Date    CORONARY ANGIOPLASTY WITH STENT PLACEMENT N/A        Social history:  Social History     Socioeconomic History    Marital status:    Tobacco Use    Smoking status: Never    Smokeless tobacco: Never   Vaping Use    Vaping status: Never Used   Substance and Sexual Activity    Alcohol use: Never    Drug use: Never    Sexual activity: Defer       Family history:  Family History   Problem Relation Age of Onset    Heart attack Father     Coronary artery disease Brother     Other  (CABG) Brother        Allergies:  Allergies   Allergen Reactions    Codeine Nausea Only     Other Reaction(s) from [facility]: Nausea    Phenobarbital Itching       Outpatient medications:  Scheduled Meds:acetaminophen, 1,000 mg, Oral, TID  amLODIPine, 10 mg, Oral, Daily  aspirin, 81 mg, Oral, Daily  atorvastatin, 80 mg, Oral, Daily  [Held by provider] clopidogrel, 75 mg, Oral, Daily  insulin regular, 2-7 Units, Subcutaneous, Q6H  isosorbide mononitrate, 30 mg, Oral, Daily  lisinopril, 5 mg, Oral, Daily  sodium chloride, 10 mL, Intravenous, Q12H      Continuous Infusions:   PRN Meds:.  senna-docusate sodium **AND** polyethylene glycol **AND** bisacodyl **AND** bisacodyl    Calcium Replacement - Follow Nurse / BPA Driven Protocol    dextrose    dextrose    glucagon (human recombinant)    Magnesium Standard Dose Replacement - Follow Nurse / BPA Driven Protocol    melatonin    ondansetron ODT **OR** ondansetron    Phosphorus Replacement - Follow Nurse / BPA Driven Protocol    Potassium Replacement - Follow Nurse / BPA Driven Protocol    sodium chloride    Physical Examination:  General:  Vitals:    10/24/24 0738   BP: 142/77   Pulse: 51   Resp: 18   Temp: 98.1 °F (36.7 °C)   SpO2: 91%     Systolic (24hrs), Av , Min:103 , Max:144     Diastolic (24hrs), Av, Min:68, Max:95    Pulse  Av.3  Min: 50  Max: 81    Temp (24hrs), Av °F (36.7 °C), Min:97.3 °F (36.3 °C), Max:98.4 °F (36.9 °C)      Neurological: Awake, alert, and oriented. Cranial nerves II through XII are grossly intact.  There is no pronator drift.  Finger to nose testing is performed without dysmetria or bradykinesia.  Sensation is intact to light touch throughout.  Musculoskeletal: Good strength both proximally and distally in all 4 extremities.  Patient is able to move all 4 extremities.  Vascular: 2+ radial pulses bilaterally.  Cardiovascular: Regular rate and rhythm.  Extremities: No clubbing, cyanosis, or edema.  Pulmonary: Normal effort  and excursion.  No evidence of respiratory distress.  Psychiatric: Normal mood and affect  HEENT: Normocephalic, atraumatic.  Eyes: No scleral icterus.  Extraocular eye movements are intact, and pupils are equal, round, and reactive to light.    Imaging:  Today I have for my review a CT scan of the head that was completed yesterday on 10/23/2024 that reveals what appears to be an acute on chronic right-sided subdural hematoma measuring approximately 1.7 cm.  There is also an acute on chronic left-sided subdural hematoma that is smaller in size.  There is a 1 cm right-to-left midline shift.      Assessment and Plan:    Subarachnoid hemorrhage [I60.9]     I started the patient on Keppra and Decadron yesterday in which she can continue.  Continue to hold Plavix.  Also placed him on n.p.o. overnight.  Dr. Jenkins and I both spoke to the patient and his family at the bedside this morning about moving forward with a diagnostic angiogram and planned bilateral middle meningeal artery embolizations.  All questions were answered and the patient consents to move forward with this procedure.  We have added him to Dr. Jenkins schedule this morning in the Cath Lab. Neurosurgery will continue to follow, appreciate the consult.

## 2024-10-24 NOTE — H&P
[facility] Medicine Services  HISTORY AND PHYSICAL    Patient Name: Manjit Thomas  : 1955  MRN: [id number]  Primary Care Physician: Ariel Lee DO  Date of admission: 10/23/2024    Subjective   Subjective     Chief Complaint:  Headache    HPI:  Manjit Thomas is a 69 y.o. male with significant medical history of HTN, HLD, coronary atherosclerosis, s/p angioplasty with stent on Plavix, nephrolithiasis, and vitamin B12 deficiency presents to [facility] as a transfer from [facility] after CT reveals acute on chronic subdermal hematoma, requiring a higher level of care.      Patient states he has had headache for approximately 3 weeks.  Pain increases with standing, laying down, coughing or sneezing.  Patient describes intermittent episodes of weakness, numbness, and tingling of his lower extremities.  He sought medical assistance at [facility] due to progressively worsening headache.  He did have an episode of nausea vomiting today during transport from [place] to [place], now resolved.  He denies photophobia or visual disturbances.  He denies difficulty swallowing or slurred speech.  He denies head injury or trauma.  Patient is alert and oriented x 4 follows commands.  He denies chest pain or shortness of breath.  No reported fevers chills or diarrhea.  He is resting comfortably in bed with no complaints currently.      Review of Systems   Constitutional:  Negative for activity change, chills, fatigue and fever.   Eyes:  Negative for photophobia and visual disturbance.   Respiratory:  Negative for cough, chest tightness and shortness of breath.    Cardiovascular:  Negative for chest pain and leg swelling.   Gastrointestinal:  Positive for nausea and vomiting. Negative for abdominal distention and diarrhea.   Endocrine: Negative.    Genitourinary:  Negative for difficulty urinating.   Musculoskeletal: Negative.    Skin: Negative.    Allergic/Immunologic:  Negative.    Neurological:  Positive for weakness, numbness and headaches (Intermittent headaches for 3 weeks). Negative for dizziness, seizures and speech difficulty.   Hematological: Negative.    Psychiatric/Behavioral: Negative.                Personal History     Past Medical History:   Diagnosis Date    Coronary atherosclerosis     Diabetes mellitus     Headache     HLD (hyperlipidemia)     Hypertension     Vitamin B12 deficiency              Past Surgical History:   Procedure Laterality Date    CORONARY ANGIOPLASTY WITH STENT PLACEMENT N/A        Family History: family history includes CABG in his brother; Coronary artery disease in his brother; Heart attack in his father.     Social History:  reports that he has never smoked. He has never used smokeless tobacco. He reports that he does not drink alcohol and does not use drugs.  Social History     Social History Narrative    Not on file       Medications:  amLODIPine, aspirin, atorvastatin, clopidogrel, dapagliflozin Propanediol, fluticasone, folic acid, isosorbide mononitrate, lisinopril, and vitamin B-12    Allergies   Allergen Reactions    Codeine Nausea Only     Other Reaction(s) from [facility]: Nausea    Phenobarbital Itching       Objective   Objective     Vital Signs:   Temp:  [97.3 °F (36.3 °C)-98.4 °F (36.9 °C)] 98.4 °F (36.9 °C)  Heart Rate:  [50-81] 54  Resp:  [16] 16  BP: (103-143)/(68-95) 130/79    Physical Exam  Constitutional:       Appearance: Normal appearance.   HENT:      Head: Normocephalic and atraumatic.   Eyes:      Extraocular Movements: Extraocular movements intact.      Pupils: Pupils are equal, round, and reactive to light.   Cardiovascular:      Rate and Rhythm: Normal rate and regular rhythm.   Pulmonary:      Effort: Pulmonary effort is normal.      Breath sounds: Normal breath sounds.   Abdominal:      General: Bowel sounds are normal.      Palpations: Abdomen is soft.      Tenderness: There is no abdominal tenderness.    Musculoskeletal:      Right lower leg: No edema.      Left lower leg: No edema.   Skin:     General: Skin is warm and dry.   Neurological:      General: No focal deficit present.      Mental Status: He is alert and oriented to person, place, and time.   Psychiatric:         Mood and Affect: Mood normal.         Behavior: Behavior normal.          Result Review:  I have personally reviewed the results from the time of this admission to 10/24/2024 00:53 EDT and agree with these findings:  [x]  Laboratory list / accordion  []  Microbiology  [x]  Radiology  [x]  EKG/Telemetry   []  Cardiology/Vascular   []  Pathology  [x]  Old records  []  Other:      LAB RESULTS:      Lab 10/23/24  1853 10/18/24  0916   WBC 16.38* 7.7   HEMOGLOBIN 14.9 15.7   HEMATOCRIT 45.6 48.3   PLATELETS 260 260   NEUTROS ABS 14.89* 5.0   IMMATURE GRANS (ABS) 0.10*  --    LYMPHS ABS 0.64* 1.7   MONOS ABS 0.70 0.8   EOS ABS 0.00 0.2   MCV 89.9 91         Lab 10/23/24  1746   SODIUM 141   POTASSIUM 4.6   CHLORIDE 104   CO2 20.8*   ANION GAP 16.2*   BUN 19   CREATININE 1.28*   EGFR 60.6   GLUCOSE 158*   CALCIUM 9.9         Lab 10/23/24  1746   TOTAL PROTEIN 7.2   ALBUMIN 4.3   GLOBULIN 2.9   ALT (SGPT) 28   AST (SGOT) 24   BILIRUBIN 0.5   ALK PHOS 77         Lab 10/23/24  2012 10/23/24  1746   HSTROP T 12 7                 Brief Urine Lab Results       None          Microbiology Results (last 10 days)       ** No results found for the last 240 hours. **            CT Head Without Contrast    Result Date: 10/23/2024  INDICATION: Headache.  COMPARISON: No relevant priors available  TECHNIQUE: Axial CT images of the head and sinuses were obtained without IV contrast. Coronal and sagittal reformations were reviewed.  FINDINGS: Acute on chronic right subdural hematoma measuring 1.7 cm maximum thickness. Acute on chronic left-sided subdural hematoma measuring up to 0.5 cm. Right-to-left midline shift 1 cm.  Gray-white differentiation is relatively  preserved. No evidence of acute large territorial infarction. Ventricles appear normal in size. Basal cisterns are patent. No depressed calvarial fracture.  Paranasal sinuses are well aerated. No air-fluid level. Rightward nasal septum deviation. Orbits and globes appear unremarkable.      Impression: 1. Acute on chronic right subdural hematoma measuring 1.7 cm maximum thickness. Acute on chronic left-sided subdural hematoma measuring up to 0.5 cm. Right-to-left midline shift 1 cm. 2. Sinuses appear unremarkable.  Report called to Dr. Serrano at 347PM EST 10/23/2024.   This report was finalized on 10/23/2024 6:50 PM by Alex Pallas, DO.      CT Sinus Without Contrast    Result Date: 10/23/2024  INDICATION: Headache.  COMPARISON: No relevant priors available  TECHNIQUE: Axial CT images of the head and sinuses were obtained without IV contrast. Coronal and sagittal reformations were reviewed.  FINDINGS: Acute on chronic right subdural hematoma measuring 1.7 cm maximum thickness. Acute on chronic left-sided subdural hematoma measuring up to 0.5 cm. Right-to-left midline shift 1 cm.  Gray-white differentiation is relatively preserved. No evidence of acute large territorial infarction. Ventricles appear normal in size. Basal cisterns are patent. No depressed calvarial fracture.  Paranasal sinuses are well aerated. No air-fluid level. Rightward nasal septum deviation. Orbits and globes appear unremarkable.      Impression: 1. Acute on chronic right subdural hematoma measuring 1.7 cm maximum thickness. Acute on chronic left-sided subdural hematoma measuring up to 0.5 cm. Right-to-left midline shift 1 cm. 2. Sinuses appear unremarkable.  Report called to Dr. Serrano at 347PM EST 10/23/2024.   This report was finalized on 10/23/2024 6:50 PM by Alex Pallas, DO.           Assessment & Plan   Assessment & Plan       Subarachnoid hemorrhage    Type 2 diabetes mellitus, without long-term current use of insulin    HLD  "(hyperlipidemia)    HTN (hypertension)    Coronary atherosclerosis due to lipid rich plaque    Headache  Subarachnoid hemorrhage  -CT scan reveals acute on chronic subdural hematoma  -Pain control  -Antiemetics  -Neuro checks  -Consult neurosurgery, Dr. Jenkins  -N.p.o. after midnight  -CMP CBC in a.m.    Generalized weakness  -Fall risk  -PT/ OT consult    Elevated creatinine  Creatinine 1.28  GFR 60    HTN  HLD  Coronary atherosclerosis  -Troponin initial 7, repeat 12  -EKG sinus rhythm with PVCs  -Patient denies chest pain  -Continue aspirin  -Hold Plavix Plavix  -Continue isosorbide, lisinopril, atorvastatin, and amlodipine      Type 2 diabetes without long-term insulin use  -Patient on Farxiga  -FSBG, SSI  -A1c in a.m.    DVT prophylaxis: Mechanical    CODE STATUS: Full code       Expected Discharge      This note has been completed as part of a split-shared workflow.     Signature: Electronically signed by JOSÉ MIGUEL Mercado, 10/24/24, 12:54 AM EDT      Attending   Admission Attestation       I have performed an independent face-to-face diagnostic evaluation including performing an independent physical examination.  I approve of the documented plan of care above that was reviewed and developed with the advanced practice clinician (APC) and take responsibility for that plan along with its associated risks.  I have updated the HPI as appropriate.    Brief HPI    69M states that for the last 3 weeks he has noticed increased frequency of headache, global, dull, and he can cite no exacerbating or alleviating factors.  He has had some numbness and tingling of the left upper extremity as well, including earlier today (Wednesday 10/23).  Family also thought he had some slurred speech earlier today but this resolved after only \"a couple of moments.\"  He states his headache was worse today than prior episodes, and family reports that he had mentioned some numbness, tingling, and weakness of the left lower extremity, so he " went to the ED [facility] for further evaluation.  CT head without contrast obtained there with radiology read mentioning acute on chronic right subdural hematoma measuring 1.7 cm maximum thickness, and acute on chronic left-sided subdural hematoma measuring up to 0.5 cm; also right to left midline shift of 1 cm (please see report for full details).  He was then transferred here for higher level of care per neurosurgery recommendation.    Attending Physical Exam:  Temp:  [97.3 °F (36.3 °C)-98.4 °F (36.9 °C)] 98.4 °F (36.9 °C)  Heart Rate:  [50-81] 54  Resp:  [16] 16  BP: (103-143)/(68-95) 130/79    Constitutional: Awake, alert, NAD.  Eyes: PERRLA, sclerae anicteric, no conjunctival injection  HENT: NCAT, mucous membranes moist  Neck: Supple, no thyromegaly, no lymphadenopathy, trachea midline  Respiratory: Clear to auscultation bilaterally, nonlabored respirations   Cardiovascular: RRR with occasional ectopic beat that coincides with when he has PVCs on his telemetry monitor, no murmurs, rubs, or gallops, palpable pedal pulses bilaterally  Gastrointestinal: Positive bowel sounds, soft, nontender, nondistended  Musculoskeletal: No bilateral ankle edema, no clubbing or cyanosis to extremities  Psychiatric: Appropriate affect, cooperative  Neurologic: Oriented x 3, strength symmetric in all extremities, Cranial Nerves grossly intact to confrontation, speech clear  Skin: No rashes, normal turgor.    Result Review:  I have personally reviewed the results from the time of this admission to 10/24/2024 02:00 EDT and agree with these findings:  [x]  Laboratory list / accordion  []  Microbiology  [x]  Radiology  [x]  EKG/Telemetry   []  Cardiology/Vascular   []  Pathology  []  Old records  []  Other:  Most notable findings include: Reviewed radiology reports from CT head and CT sinus.  I reviewed EKG which by my read shows sinus rhythm, ventricular rate approximately 75 bpm, normal axis, single PVC early in the study,  nonspecific ST/T wave changes.    Assessment and Plan:    See assessment and plan documented by APC above and updated/edited by me as appropriate.      Total time spent: 39 minutes  Time spent includes time reviewing chart, face-to-face time, counseling patient/family/caregiver, ordering medications/tests/procedures, communicating with other health care professionals, documenting clinical information in the electronic health record, and coordination of care.       John Bauman,LOUISA, DO  10/24/24

## 2024-10-25 LAB
ANION GAP SERPL CALCULATED.3IONS-SCNC: 12 MMOL/L (ref 5–15)
BASOPHILS # BLD AUTO: 0.04 10*3/MM3 (ref 0–0.2)
BASOPHILS NFR BLD AUTO: 0.4 % (ref 0–1.5)
BUN SERPL-MCNC: 23 MG/DL (ref 8–23)
BUN/CREAT SERPL: 21.5 (ref 7–25)
CALCIUM SPEC-SCNC: 9.2 MG/DL (ref 8.6–10.5)
CHLORIDE SERPL-SCNC: 105 MMOL/L (ref 98–107)
CO2 SERPL-SCNC: 21 MMOL/L (ref 22–29)
CREAT SERPL-MCNC: 1.07 MG/DL (ref 0.76–1.27)
DEPRECATED RDW RBC AUTO: 46 FL (ref 37–54)
EGFRCR SERPLBLD CKD-EPI 2021: 75.1 ML/MIN/1.73
EOSINOPHIL # BLD AUTO: 0.08 10*3/MM3 (ref 0–0.4)
EOSINOPHIL NFR BLD AUTO: 0.8 % (ref 0.3–6.2)
ERYTHROCYTE [DISTWIDTH] IN BLOOD BY AUTOMATED COUNT: 14.3 % (ref 12.3–15.4)
GLUCOSE BLDC GLUCOMTR-MCNC: 115 MG/DL (ref 70–130)
GLUCOSE BLDC GLUCOMTR-MCNC: 123 MG/DL (ref 70–130)
GLUCOSE BLDC GLUCOMTR-MCNC: 164 MG/DL (ref 70–130)
GLUCOSE BLDC GLUCOMTR-MCNC: 94 MG/DL (ref 70–130)
GLUCOSE SERPL-MCNC: 105 MG/DL (ref 65–99)
HCT VFR BLD AUTO: 40.4 % (ref 37.5–51)
HGB BLD-MCNC: 13.7 G/DL (ref 13–17.7)
IMM GRANULOCYTES # BLD AUTO: 0.03 10*3/MM3 (ref 0–0.05)
IMM GRANULOCYTES NFR BLD AUTO: 0.3 % (ref 0–0.5)
LYMPHOCYTES # BLD AUTO: 1.9 10*3/MM3 (ref 0.7–3.1)
LYMPHOCYTES NFR BLD AUTO: 18.9 % (ref 19.6–45.3)
MAGNESIUM SERPL-MCNC: 2.6 MG/DL (ref 1.6–2.4)
MCH RBC QN AUTO: 30.1 PG (ref 26.6–33)
MCHC RBC AUTO-ENTMCNC: 33.9 G/DL (ref 31.5–35.7)
MCV RBC AUTO: 88.8 FL (ref 79–97)
MONOCYTES # BLD AUTO: 1.06 10*3/MM3 (ref 0.1–0.9)
MONOCYTES NFR BLD AUTO: 10.6 % (ref 5–12)
NEUTROPHILS NFR BLD AUTO: 6.92 10*3/MM3 (ref 1.7–7)
NEUTROPHILS NFR BLD AUTO: 69 % (ref 42.7–76)
NRBC BLD AUTO-RTO: 0 /100 WBC (ref 0–0.2)
PLATELET # BLD AUTO: 234 10*3/MM3 (ref 140–450)
PMV BLD AUTO: 11.5 FL (ref 6–12)
POTASSIUM SERPL-SCNC: 4 MMOL/L (ref 3.5–5.2)
RBC # BLD AUTO: 4.55 10*6/MM3 (ref 4.14–5.8)
SODIUM SERPL-SCNC: 138 MMOL/L (ref 136–145)
WBC NRBC COR # BLD AUTO: 10.03 10*3/MM3 (ref 3.4–10.8)

## 2024-10-25 PROCEDURE — 83735 ASSAY OF MAGNESIUM: CPT | Performed by: PHYSICIAN ASSISTANT

## 2024-10-25 PROCEDURE — 85025 COMPLETE CBC W/AUTO DIFF WBC: CPT | Performed by: STUDENT IN AN ORGANIZED HEALTH CARE EDUCATION/TRAINING PROGRAM

## 2024-10-25 PROCEDURE — 93010 ELECTROCARDIOGRAM REPORT: CPT | Performed by: INTERNAL MEDICINE

## 2024-10-25 PROCEDURE — 80048 BASIC METABOLIC PNL TOTAL CA: CPT | Performed by: STUDENT IN AN ORGANIZED HEALTH CARE EDUCATION/TRAINING PROGRAM

## 2024-10-25 PROCEDURE — 63710000001 DEXAMETHASONE PER 0.25 MG

## 2024-10-25 PROCEDURE — 99232 SBSQ HOSP IP/OBS MODERATE 35: CPT | Performed by: INTERNAL MEDICINE

## 2024-10-25 PROCEDURE — 99222 1ST HOSP IP/OBS MODERATE 55: CPT | Performed by: INTERNAL MEDICINE

## 2024-10-25 PROCEDURE — 93005 ELECTROCARDIOGRAM TRACING: CPT | Performed by: HOSPITALIST

## 2024-10-25 PROCEDURE — 93005 ELECTROCARDIOGRAM TRACING: CPT | Performed by: STUDENT IN AN ORGANIZED HEALTH CARE EDUCATION/TRAINING PROGRAM

## 2024-10-25 PROCEDURE — 82948 REAGENT STRIP/BLOOD GLUCOSE: CPT

## 2024-10-25 RX ORDER — DEXAMETHASONE 4 MG/1
4 TABLET ORAL EVERY 6 HOURS SCHEDULED
Status: DISCONTINUED | OUTPATIENT
Start: 2024-10-25 | End: 2024-10-26

## 2024-10-25 RX ADMIN — ISOSORBIDE MONONITRATE 30 MG: 30 TABLET, EXTENDED RELEASE ORAL at 08:46

## 2024-10-25 RX ADMIN — Medication 10 ML: at 08:48

## 2024-10-25 RX ADMIN — DEXAMETHASONE 4 MG: 4 TABLET ORAL at 17:22

## 2024-10-25 RX ADMIN — ATORVASTATIN CALCIUM 80 MG: 40 TABLET, FILM COATED ORAL at 08:46

## 2024-10-25 RX ADMIN — DEXAMETHASONE 4 MG: 4 TABLET ORAL at 12:19

## 2024-10-25 RX ADMIN — ACETAMINOPHEN 1000 MG: 500 TABLET ORAL at 08:46

## 2024-10-25 RX ADMIN — Medication 10 ML: at 21:48

## 2024-10-25 RX ADMIN — ACETAMINOPHEN 1000 MG: 500 TABLET ORAL at 20:58

## 2024-10-25 RX ADMIN — DEXAMETHASONE 4 MG: 4 TABLET ORAL at 23:43

## 2024-10-25 RX ADMIN — ACETAMINOPHEN 1000 MG: 500 TABLET ORAL at 15:24

## 2024-10-25 RX ADMIN — AMLODIPINE BESYLATE 10 MG: 10 TABLET ORAL at 08:46

## 2024-10-25 RX ADMIN — LISINOPRIL 5 MG: 5 TABLET ORAL at 08:45

## 2024-10-25 RX ADMIN — ASPIRIN 81 MG: 81 TABLET, COATED ORAL at 08:46

## 2024-10-25 NOTE — CASE MANAGEMENT/SOCIAL WORK
Continued Stay Note   [facility]     Patient Name: Manjit Thomas  MRN: [id number]  Today's Date: 10/25/2024    Admit Date: 10/23/2024    Plan: update   Discharge Plan       Row Name 10/25/24 1144       Plan    Plan update    Patient/Family in Agreement with Plan yes    Plan Comments Spoke with patient and family at bedside regarding discharge plan.  Patient reports he has been up independently in the room and feels steady, RN reported standby assist.  Patient anticipates discharge tomorrow.  No discharge needs verbalized.  CM following.  Patient plan is to discharge home via car with family to transport.    Final Discharge Disposition Code 01 - home or self-care                   Discharge Codes    No documentation.                 Expected Discharge Date and Time       Expected Discharge Date Expected Discharge Time    Oct 29, 2024               Wendy Staples RN

## 2024-10-25 NOTE — PROGRESS NOTES
[facility] Medicine Services  PROGRESS NOTE    Patient Name: Manjit Thomas  : 1955  MRN: [id number]    Date of Admission: 10/23/2024  Primary Care Physician: Ariel Lee DO    Subjective   Subjective     CC:  Bilateral subdural hematoma    HPI:  Resting in bed in no acute distress and tells me that he feels better.  Overall he is comfortable and he is close to his baseline.  No fever or chills.  No headache.  No nausea vomiting or diarrhea.  No chest pain or shortness of breath.      Objective   Objective     Vital Signs:   Temp:  [97.3 °F (36.3 °C)-98.4 °F (36.9 °C)] 98.4 °F (36.9 °C)  Heart Rate:  [35-55] 50  Resp:  [16] 16  BP: (121-178)/(63-80) 121/63     Physical exam:  Constitutional: No acute distress, awake, alert  HENT: NCAT, mucous membranes moist  Respiratory: Clear to auscultation bilaterally, respiratory effort normal   Cardiovascular: RRR, no murmurs, rubs, or gallops  Gastrointestinal: Positive bowel sounds, soft, nontender, nondistended  Musculoskeletal: No bilateral ankle edema  Psychiatric: Appropriate affect, cooperative  Neurologic: Oriented x 3, no focality appreciated, speech clear  Skin: No rashes     Results Reviewed:  LAB RESULTS:      Lab 10/25/24  0558 10/24/24  0808 10/24/24  0807 10/23/24  2012 10/23/24  1853 10/23/24  1746   WBC 10.03  --  12.95*  --  16.38*  --    HEMOGLOBIN 13.7  --  14.6  --  14.9  --    HEMATOCRIT 40.4  --  42.6  --  45.6  --    PLATELETS 234  --  259  --  260  --    NEUTROS ABS 6.92  --  11.07*  --  14.89*  --    IMMATURE GRANS (ABS) 0.03  --  0.06*  --  0.10*  --    LYMPHS ABS 1.90  --  1.14  --  0.64*  --    MONOS ABS 1.06*  --  0.67  --  0.70  --    EOS ABS 0.08  --  0.00  --  0.00  --    MCV 88.8  --  87.5  --  89.9  --    PROTIME  --  13.9  --   --   --   --    HSTROP T  --   --   --  12  --  7         Lab 10/25/24  0558 10/24/24  0808 10/23/24  1746   SODIUM 138 141 141   POTASSIUM 4.0 5.1 4.6   CHLORIDE 105 104  104   CO2 21.0* 24.0 20.8*   ANION GAP 12.0 13.0 16.2*   BUN 23 23 19   CREATININE 1.07 1.14 1.28*   EGFR 75.1 69.6 60.6   GLUCOSE 105* 102* 158*   CALCIUM 9.2 10.4 9.9   MAGNESIUM 2.6*  --   --    HEMOGLOBIN A1C  --  5.70*  --          Lab 10/24/24  0808 10/23/24  1746   TOTAL PROTEIN 6.8 7.2   ALBUMIN 4.1 4.3   GLOBULIN 2.7 2.9   ALT (SGPT) 25 28   AST (SGOT) 22 24   BILIRUBIN 0.6 0.5   ALK PHOS 72 77         Lab 10/24/24  0808 10/23/24  2012 10/23/24  1746   HSTROP T  --  12 7   PROTIME 13.9  --   --    INR 1.06  --   --                  Brief Urine Lab Results       None            Microbiology Results Abnormal       None            Invasive peripheral vascular study    Result Date: 10/24/2024    Status post successful right middle meningeal artery embolization without apparent complication.   Remainder of the diagnostic portion of the angiogram was unremarkable   Incidental notation is made of low-grade, calcific stenosis seen at the origin of the left internal carotid artery.     CT Head Without Contrast    Result Date: 10/23/2024  INDICATION: Headache.  COMPARISON: No relevant priors available  TECHNIQUE: Axial CT images of the head and sinuses were obtained without IV contrast. Coronal and sagittal reformations were reviewed.  FINDINGS: Acute on chronic right subdural hematoma measuring 1.7 cm maximum thickness. Acute on chronic left-sided subdural hematoma measuring up to 0.5 cm. Right-to-left midline shift 1 cm.  Gray-white differentiation is relatively preserved. No evidence of acute large territorial infarction. Ventricles appear normal in size. Basal cisterns are patent. No depressed calvarial fracture.  Paranasal sinuses are well aerated. No air-fluid level. Rightward nasal septum deviation. Orbits and globes appear unremarkable.      Impression: 1. Acute on chronic right subdural hematoma measuring 1.7 cm maximum thickness. Acute on chronic left-sided subdural hematoma measuring up to 0.5 cm.  Right-to-left midline shift 1 cm. 2. Sinuses appear unremarkable.  Report called to Dr. Serrano at 347PM EST 10/23/2024.   This report was finalized on 10/23/2024 6:50 PM by Alex Pallas, DO.      CT Sinus Without Contrast    Result Date: 10/23/2024  INDICATION: Headache.  COMPARISON: No relevant priors available  TECHNIQUE: Axial CT images of the head and sinuses were obtained without IV contrast. Coronal and sagittal reformations were reviewed.  FINDINGS: Acute on chronic right subdural hematoma measuring 1.7 cm maximum thickness. Acute on chronic left-sided subdural hematoma measuring up to 0.5 cm. Right-to-left midline shift 1 cm.  Gray-white differentiation is relatively preserved. No evidence of acute large territorial infarction. Ventricles appear normal in size. Basal cisterns are patent. No depressed calvarial fracture.  Paranasal sinuses are well aerated. No air-fluid level. Rightward nasal septum deviation. Orbits and globes appear unremarkable.      Impression: 1. Acute on chronic right subdural hematoma measuring 1.7 cm maximum thickness. Acute on chronic left-sided subdural hematoma measuring up to 0.5 cm. Right-to-left midline shift 1 cm. 2. Sinuses appear unremarkable.  Report called to Dr. Serrano at 347PM EST 10/23/2024.   This report was finalized on 10/23/2024 6:50 PM by Alex Pallas, DO.           Current medications:  Scheduled Meds:acetaminophen, 1,000 mg, Oral, TID  amLODIPine, 10 mg, Oral, Daily  aspirin, 81 mg, Oral, Daily  atorvastatin, 80 mg, Oral, Daily  [Held by provider] clopidogrel, 75 mg, Oral, Daily  dexAMETHasone, 4 mg, Oral, Q6H  insulin regular, 2-7 Units, Subcutaneous, TID With Meals  isosorbide mononitrate, 30 mg, Oral, Daily  lisinopril, 5 mg, Oral, Daily  sodium chloride, 10 mL, Intravenous, Q12H      Continuous Infusions:   PRN Meds:.  senna-docusate sodium **AND** polyethylene glycol **AND** bisacodyl **AND** bisacodyl    Calcium Replacement - Follow Nurse / BPA Driven  Protocol    dextrose    dextrose    glucagon (human recombinant)    Magnesium Standard Dose Replacement - Follow Nurse / BPA Driven Protocol    melatonin    nitroglycerin    ondansetron ODT **OR** ondansetron    Phosphorus Replacement - Follow Nurse / BPA Driven Protocol    Potassium Replacement - Follow Nurse / BPA Driven Protocol    sodium chloride    Assessment & Plan   Assessment & Plan     Active Hospital Problems    Diagnosis  POA    **Subdural hematoma, nontraumatic [I62.00]  Yes    Type 2 diabetes mellitus, without long-term current use of insulin [E11.9]  Unknown    HLD (hyperlipidemia) [E78.5]  Unknown    HTN (hypertension) [I10]  Unknown    Coronary atherosclerosis due to lipid rich plaque [I25.83]  Unknown    Generalized weakness [R53.1]  Unknown    Subarachnoid hemorrhage [I60.9]  Yes      Resolved Hospital Problems   No resolved problems to display.        Brief Hospital Course to date:  Manjit Thomas is a 69 y.o. male  with a PMHx HTN, HLD, coronary atherosclerosis, s/p angioplasty with stent on Plavix, nephrolithiasis, and vitamin B12 deficiency presenting with acute on chronic bilateral subdural hematoma.     Headache  Acute on chronic bilateral subdural hematoma  - 3 weeks of headache, 1 day of nausea/vomiting. Denies any trauma/falls.   - CT head: Acute on chronic right subdural hematoma measuring 1.7 cm maximum  thickness. Acute on chronic left-sided subdural hematoma measuring up to 0.5 cm. Right-to-left midline shift 1 cm.  -Neurosurgery has seen and evaluated the patient.  Patient has had MMA embolization by IR.     Generalized weakness  -PT/ OT has seen and evaluate the patient.  -Currently is close to baseline.     HTN  Coronary atherosclerosis  -Stable and currently asymptomatic.    Asymptomatic bradycardia  -Cardiology has seen and evaluated patient.  -Recommendation is to discontinue Plavix, continue current cardiac medication including aspirin and atorvastatin and patient should  follow-up with his primary cardiologist Dr. Gomez in [place]    HLD  -Continue atorvastatin.     Type 2 diabetes without long-term insulin use  -Patient on Farxiga  -A1c 5.70  -FSBG, SSI.    Expected Discharge Location and Transportation: D  Expected Discharge   Expected Discharge Date: 10/29/2024; Expected Discharge Time:      VTE Prophylaxis:  Mechanical VTE prophylaxis orders are present.         AM-PAC 6 Clicks Score (PT): 22 (10/25/24 0800)    CODE STATUS:   Code Status and Medical Interventions: CPR (Attempt to Resuscitate); Full Support   Ordered at: 10/24/24 0054     Level Of Support Discussed With:    Patient     Code Status (Patient has no pulse and is not breathing):    CPR (Attempt to Resuscitate)     Medical Interventions (Patient has pulse or is breathing):    Full Support       Fausto Meyer MD  10/25/24

## 2024-10-25 NOTE — PLAN OF CARE
Problem: Adult Inpatient Plan of Care  Goal: Plan of Care Review  Outcome: Progressing  Goal: Patient-Specific Goal (Individualized)  Outcome: Progressing  Goal: Absence of Hospital-Acquired Illness or Injury  Outcome: Progressing  Intervention: Identify and Manage Fall Risk  Recent Flowsheet Documentation  Taken 10/25/2024 0243 by Chad Dawn RN  Safety Promotion/Fall Prevention:   safety round/check completed   assistive device/personal items within reach  Taken 10/25/2024 0005 by Chad Dawn RN  Safety Promotion/Fall Prevention:   safety round/check completed   assistive device/personal items within reach  Taken 10/24/2024 2205 by Chad Dawn RN  Safety Promotion/Fall Prevention:   safety round/check completed   assistive device/personal items within reach  Taken 10/24/2024 2005 by Chad Dawn RN  Safety Promotion/Fall Prevention:   safety round/check completed   nonskid shoes/slippers when out of bed   lighting adjusted   fall prevention program maintained   clutter free environment maintained   assistive device/personal items within reach  Intervention: Prevent Skin Injury  Recent Flowsheet Documentation  Taken 10/25/2024 0243 by Chad Dawn RN  Body Position: position changed independently  Taken 10/25/2024 0005 by Chad Dawn RN  Body Position: position changed independently  Taken 10/24/2024 2205 by Chad Dawn RN  Body Position: position changed independently  Taken 10/24/2024 2005 by Chad Dawn RN  Body Position: position changed independently  Skin Protection:   protective footwear used   transparent dressing maintained  Intervention: Prevent Infection  Recent Flowsheet Documentation  Taken 10/25/2024 0243 by Chad Dawn RN  Infection Prevention: rest/sleep promoted  Taken 10/25/2024 0005 by Chad Dawn RN  Infection Prevention: rest/sleep promoted  Taken 10/24/2024 2205 by Chad Dawn RN  Infection Prevention: rest/sleep promoted  Taken 10/24/2024 2005 by López  Chad RN  Infection Prevention:   rest/sleep promoted   hand hygiene promoted  Goal: Optimal Comfort and Wellbeing  Outcome: Progressing  Goal: Readiness for Transition of Care  Outcome: Progressing     Problem: Comorbidity Management  Goal: Blood Glucose Level Within Target Range  Outcome: Progressing  Goal: Blood Pressure in Desired Range  Outcome: Progressing     Problem: Fall Injury Risk  Goal: Absence of Fall and Fall-Related Injury  Outcome: Progressing  Intervention: Promote Injury-Free Environment  Recent Flowsheet Documentation  Taken 10/25/2024 0243 by Chad Dawn RN  Safety Promotion/Fall Prevention:   safety round/check completed   assistive device/personal items within reach  Taken 10/25/2024 0005 by Chad Dawn, RN  Safety Promotion/Fall Prevention:   safety round/check completed   assistive device/personal items within reach  Taken 10/24/2024 2205 by Chad Dawn, RN  Safety Promotion/Fall Prevention:   safety round/check completed   assistive device/personal items within reach  Taken 10/24/2024 2005 by Chad Dawn, RN  Safety Promotion/Fall Prevention:   safety round/check completed   nonskid shoes/slippers when out of bed   lighting adjusted   fall prevention program maintained   clutter free environment maintained   assistive device/personal items within reach   Goal Outcome Evaluation:

## 2024-10-25 NOTE — CONSULTS
[facility]   [address], [address]  [place], [place], [address]    [phone]  [email or web address]           INPATIENT CONSULTATION NOTE    Patient Care Team:  Patient Care Team:  Ariel Lee DO as PCP - General (Family Medicine)    Requesting Provider and Reason for consultation: The patient is being seen today at the request of Dr. Meyer for bradycardia.     Chief complaint: Bradycardia, PVCs         Subjective:     Cardiac focused problem list:  CAD  Status post JESSE x 2 to LAD in 2019, data deficient  Echocardiogram 2019 with reportedly normal EF, data deficient.   Subdural hematoma, bilateral  CT head, 10/23/2024:  Acute on chronic right subdural hematoma measuring 1.7 cm, acute on chronic left sided subdural hematoma measuring 0.5 cm.  Right to left midline shift 1 cm.   Status post MMA embolization for bilateral subdural hematomas, 10/24/2024, Dr. Jenkins.   Hypertension   Hyperlipidemia   Possible history of paroxysmal atrial fibrillation   Type 2 diabetes mellitus     HPI:      Manjit Thomas is a 69 y.o. male.  Patient presented to OSH with complaints of headache for 3 weeks.  CT head demonstrated bilateral subdural hematomas, R>L.  Patient transferred to [facility] on 10/23/2024 for higher level of care.  He was evaluated by neurosurgery and underwent embolization for bilateral subdural hematomas yesterday with Dr. Jenkins.      Patient has history of CAD status post JESSE x 2 to LAD in 2019, hypertension, hyperlipidemia, PAF and diabetes.  His last stent was in 2019.  He was admitted on aspirin and Plavix.  Cardiology is consulted for bradycardia.  Telemetry reveals sinus bradycardia with frequent, sometimes bigeminal PVCs.  Last night he did have a brief junctional rhythm.  Patient reports baseline heart rate in the 50s and known history of PVCs.  He is asymptomatic.  Denies chest pain, shortness of breath, palpitations, lightheadedness or syncope.    Review of  Systems:  As noted in the HPI    PFSH:  Patient Active Problem List   Diagnosis    Type 2 diabetes mellitus, without long-term current use of insulin    HLD (hyperlipidemia)    HTN (hypertension)    Coronary atherosclerosis due to lipid rich plaque    Generalized weakness    Subdural hematoma, nontraumatic    Subarachnoid hemorrhage       No current facility-administered medications on file prior to encounter.     Current Outpatient Medications on File Prior to Encounter   Medication Sig Dispense Refill    amLODIPine (NORVASC) 10 MG tablet Take 1 tablet by mouth Daily.      aspirin 81 MG EC tablet Take 1 tablet by mouth Daily.      atorvastatin (LIPITOR) 20 MG tablet Take 4 tablets by mouth Daily.      clopidogrel (PLAVIX) 75 MG tablet Take 1 tablet by mouth Daily.      dapagliflozin Propanediol (Farxiga) 10 MG tablet Take 10 mg by mouth Daily.      fluticasone (FLONASE) 50 MCG/ACT nasal spray Administer 2 sprays into the nostril(s) as directed by provider Daily.      folic acid (FOLVITE) 1 MG tablet Take 1 tablet by mouth Daily.      isosorbide mononitrate (IMDUR) 30 MG 24 hr tablet Take 1 tablet by mouth Daily.      lisinopril (PRINIVIL,ZESTRIL) 5 MG tablet Take 0.5 tablets by mouth Daily.      vitamin B-12 (CYANOCOBALAMIN) 1000 MCG tablet Take 1 tablet by mouth Daily.         Social History     Socioeconomic History    Marital status:    Tobacco Use    Smoking status: Never    Smokeless tobacco: Never   Vaping Use    Vaping status: Never Used   Substance and Sexual Activity    Alcohol use: Never    Drug use: Never    Sexual activity: Defer            Objective:     Vital Sign Min/Max for last 24 hours  Temp  Min: 97.3 °F (36.3 °C)  Max: 98.1 °F (36.7 °C)   BP  Min: 115/103  Max: 182/92   Pulse  Min: 35  Max: 63   Resp  Min: 16  Max: 16   SpO2  Min: 89 %  Max: 95 %   No data recorded      Intake/Output Summary (Last 24 hours) at 10/25/2024 1027  Last data filed at 10/24/2024 2005  Gross per 24 hour   Intake  480 ml   Output --   Net 480 ml           Vitals:    10/25/24 0845   BP: 141/78   Pulse: 52   Resp:    Temp:    SpO2:      CONSTITUTIONAL: No acute distress  HEENT: Sclera are anicteric.  Oropharynx is moist.  NECK: No thyromegaly.  No adenopathy.  RESPIRATORY: Normal effort. Clear to auscultation bilaterally without wheezing or rales  CARDIOVASCULAR: Bradycardic regular rate and rhythm with frequent ectopy, PVCs, with normal S1 and S2. Without murmur.  ABDOMEN: Soft and nontender.  No hepatosplenomegaly noted.  PERIPHERAL VASCULAR: No carotid bruit bilaterally.  Normal radial pulse. There is no lower extremity edema bilaterally.  2+ dorsalis pedis pulses with 1+ posterior tibial pulses.  SKIN: warm and dry  MUSCULOSKELETAL: No gross joint deformities are noted.  NEUROLOGIC: No focal defects identified.  PYCHIATRIC: Normal mood and affect    Labs and radiologic results:  Today's results were reviewed by myself.    Cardiac Data:             Assessment and Plan:     Problem list:    Subdural hematoma, nontraumatic    Type 2 diabetes mellitus, without long-term current use of insulin    HLD (hyperlipidemia)    HTN (hypertension)    Coronary atherosclerosis due to lipid rich plaque    Generalized weakness    Subarachnoid hemorrhage      ASSESSMENT:  Bradycardia/PVCs  EKG overnight with possible junctional bradycardia.  Currently sinus bradycardia with frequent PVCs, occasional bigeminy  Subdural hematoma, bilateral  CT head revealing bilateral subdural hematomas.   Status post embolization of bilateral subdural hematomas with Dr. Jenkins, 10/24/2025  CAD  Prior JESSE x 2 to LAD in 2019  Hypertension   Hyperlipidemia     PLAN:  Suspect trigeminocardiac reflex as a source for the patient's bradycardia.  Brief junctional bradycardia overnight following MMA embolization.  Patient remains asymptomatic.  Recommend monitoring on telemetry overnight.  Recommend discontinue of Plavix which has already been done  Continue current  cardiac medications including aspirin and atorvastatin for the patient's known coronary artery disease and previous stent placement.  Patient to follow-up with primary cardiologist Dr. Gomez in [place].      Scribed for Dr. Agosto by Giovana Hager, APRN. 10/25/2024  10:42 EDT    I Lisbet Agosto MD personally performed the services described in this documentation as scribed by the above individual in my presence, and it is both accurate and complete.    Lisbet Agosto MD, FACC

## 2024-10-25 NOTE — PLAN OF CARE
Goal Outcome Evaluation:  Plan of Care Reviewed With: patient           Outcome Evaluation: VSS, RA, Warren on monitor and patient asymotomatic, Cardiology consult today. Urinal within reach. Call light in reach. Bed in lowest position.

## 2024-10-25 NOTE — PROGRESS NOTES
Chief complaint: S/p MMA Embolization     Admit Diagnosis:   Subarachnoid hemorrhage [I60.9]     Subjective: No events overnight. Patient with mild headache yesterday but headache has improved. Wife is at the bedside.     Objective:    Vitals:    10/25/24 0845   BP: 141/78   Pulse: 52   Resp:    Temp:    SpO2:      Pulse  Av.8  Min: 35  Max: 63  Systolic (24hrs), Av , Min:115 , Max:182     Diastolic (24hrs), Av, Min:66, Max:103    Temp (24hrs), Av.8 °F (36.6 °C), Min:97.3 °F (36.3 °C), Max:98.1 °F (36.7 °C)      He is awake, alert, pleasant and conversant. Sitting up in the bed, no acute distress. No pronator drift. Femoral pulse is palpable. Able to move all 4 extremities.     Lab Results   Component Value Date     10/25/2024       A/P:   Admit Diagnosis:   Subarachnoid hemorrhage [I60.9]     Patient is status postop day 1 MMA embolization for bilateral subdural hematomas with Dr. Jenkins.  From a neurosurgical standpoint, he can go home when medically ready.  We would like for him to follow-up with Dr. Jenkins in 2 weeks.  He will need to go home on a steroid taper.  He will also need to be [facility] with [facility].  I have discussed the plan of care with the hospitalist.  He can work with physical therapy and continue to ambulate the patient.  He is okay to resume all medications at this point.  Signs and symptoms associated with worsening subdural hematoma reviewed at the bedside today.  Patient's spouse is at the bedside and all questions were answered.

## 2024-10-26 ENCOUNTER — APPOINTMENT (OUTPATIENT)
Dept: CT IMAGING | Facility: HOSPITAL | Age: 69
DRG: 022 | End: 2024-10-26
Payer: MEDICARE

## 2024-10-26 LAB
ANION GAP SERPL CALCULATED.3IONS-SCNC: 11 MMOL/L (ref 5–15)
BUN SERPL-MCNC: 21 MG/DL (ref 8–23)
BUN/CREAT SERPL: 19.3 (ref 7–25)
CALCIUM SPEC-SCNC: 9.8 MG/DL (ref 8.6–10.5)
CHLORIDE SERPL-SCNC: 100 MMOL/L (ref 98–107)
CK SERPL-CCNC: 81 U/L (ref 20–200)
CO2 SERPL-SCNC: 22 MMOL/L (ref 22–29)
CREAT SERPL-MCNC: 1.09 MG/DL (ref 0.76–1.27)
D-LACTATE SERPL-SCNC: 2 MMOL/L (ref 0.5–2)
DEPRECATED RDW RBC AUTO: 43.9 FL (ref 37–54)
EGFRCR SERPLBLD CKD-EPI 2021: 73.5 ML/MIN/1.73
ERYTHROCYTE [DISTWIDTH] IN BLOOD BY AUTOMATED COUNT: 13.7 % (ref 12.3–15.4)
GLUCOSE BLDC GLUCOMTR-MCNC: 132 MG/DL (ref 70–130)
GLUCOSE BLDC GLUCOMTR-MCNC: 153 MG/DL (ref 70–130)
GLUCOSE BLDC GLUCOMTR-MCNC: 183 MG/DL (ref 70–130)
GLUCOSE BLDC GLUCOMTR-MCNC: 290 MG/DL (ref 70–130)
GLUCOSE BLDC GLUCOMTR-MCNC: 306 MG/DL (ref 70–130)
GLUCOSE SERPL-MCNC: 160 MG/DL (ref 65–99)
HCT VFR BLD AUTO: 43.8 % (ref 37.5–51)
HGB BLD-MCNC: 15.1 G/DL (ref 13–17.7)
MAGNESIUM SERPL-MCNC: 2.3 MG/DL (ref 1.6–2.4)
MCH RBC QN AUTO: 29.7 PG (ref 26.6–33)
MCHC RBC AUTO-ENTMCNC: 34.5 G/DL (ref 31.5–35.7)
MCV RBC AUTO: 86.2 FL (ref 79–97)
PLATELET # BLD AUTO: 270 10*3/MM3 (ref 140–450)
PMV BLD AUTO: 11.8 FL (ref 6–12)
POTASSIUM SERPL-SCNC: 4.3 MMOL/L (ref 3.5–5.2)
PROCALCITONIN SERPL-MCNC: 0.04 NG/ML (ref 0–0.25)
QT INTERVAL: 414 MS
QT INTERVAL: 452 MS
QTC INTERVAL: 403 MS
QTC INTERVAL: 414 MS
RBC # BLD AUTO: 5.08 10*6/MM3 (ref 4.14–5.8)
SODIUM SERPL-SCNC: 133 MMOL/L (ref 136–145)
WBC NRBC COR # BLD AUTO: 11.4 10*3/MM3 (ref 3.4–10.8)

## 2024-10-26 PROCEDURE — 83605 ASSAY OF LACTIC ACID: CPT | Performed by: FAMILY MEDICINE

## 2024-10-26 PROCEDURE — 99223 1ST HOSP IP/OBS HIGH 75: CPT | Performed by: STUDENT IN AN ORGANIZED HEALTH CARE EDUCATION/TRAINING PROGRAM

## 2024-10-26 PROCEDURE — 93010 ELECTROCARDIOGRAM REPORT: CPT | Performed by: INTERNAL MEDICINE

## 2024-10-26 PROCEDURE — 99291 CRITICAL CARE FIRST HOUR: CPT | Performed by: STUDENT IN AN ORGANIZED HEALTH CARE EDUCATION/TRAINING PROGRAM

## 2024-10-26 PROCEDURE — 82948 REAGENT STRIP/BLOOD GLUCOSE: CPT

## 2024-10-26 PROCEDURE — 84145 PROCALCITONIN (PCT): CPT | Performed by: FAMILY MEDICINE

## 2024-10-26 PROCEDURE — 94799 UNLISTED PULMONARY SVC/PX: CPT

## 2024-10-26 PROCEDURE — 80048 BASIC METABOLIC PNL TOTAL CA: CPT | Performed by: FAMILY MEDICINE

## 2024-10-26 PROCEDURE — 70450 CT HEAD/BRAIN W/O DYE: CPT

## 2024-10-26 PROCEDURE — 63710000001 DEXAMETHASONE PER 0.25 MG

## 2024-10-26 PROCEDURE — 93005 ELECTROCARDIOGRAM TRACING: CPT | Performed by: INTERNAL MEDICINE

## 2024-10-26 PROCEDURE — 82550 ASSAY OF CK (CPK): CPT

## 2024-10-26 PROCEDURE — 83735 ASSAY OF MAGNESIUM: CPT | Performed by: FAMILY MEDICINE

## 2024-10-26 PROCEDURE — 25010000002 LEVETRIRACETAM PER 10 MG: Performed by: PHYSICIAN ASSISTANT

## 2024-10-26 PROCEDURE — 99232 SBSQ HOSP IP/OBS MODERATE 35: CPT | Performed by: INTERNAL MEDICINE

## 2024-10-26 PROCEDURE — 63710000001 INSULIN REGULAR HUMAN PER 5 UNITS: Performed by: PHYSICIAN ASSISTANT

## 2024-10-26 PROCEDURE — 85027 COMPLETE CBC AUTOMATED: CPT | Performed by: FAMILY MEDICINE

## 2024-10-26 RX ORDER — LEVETIRACETAM 500 MG/5ML
1000 INJECTION, SOLUTION, CONCENTRATE INTRAVENOUS ONCE
Status: COMPLETED | OUTPATIENT
Start: 2024-10-26 | End: 2024-10-26

## 2024-10-26 RX ORDER — LEVETIRACETAM 500 MG/5ML
750 INJECTION, SOLUTION, CONCENTRATE INTRAVENOUS 2 TIMES DAILY
Status: DISCONTINUED | OUTPATIENT
Start: 2024-10-26 | End: 2024-10-27 | Stop reason: HOSPADM

## 2024-10-26 RX ORDER — HYDRALAZINE HYDROCHLORIDE 20 MG/ML
20 INJECTION INTRAMUSCULAR; INTRAVENOUS EVERY 4 HOURS PRN
Status: DISCONTINUED | OUTPATIENT
Start: 2024-10-26 | End: 2024-10-27 | Stop reason: HOSPADM

## 2024-10-26 RX ORDER — MIDAZOLAM HYDROCHLORIDE 1 MG/ML
2 INJECTION, SOLUTION INTRAMUSCULAR; INTRAVENOUS
Status: DISCONTINUED | OUTPATIENT
Start: 2024-10-26 | End: 2024-10-27 | Stop reason: HOSPADM

## 2024-10-26 RX ADMIN — LEVETIRACETAM 750 MG: 100 INJECTION, SOLUTION INTRAVENOUS at 20:05

## 2024-10-26 RX ADMIN — ISOSORBIDE MONONITRATE 30 MG: 30 TABLET, EXTENDED RELEASE ORAL at 08:40

## 2024-10-26 RX ADMIN — ASPIRIN 81 MG: 81 TABLET, COATED ORAL at 08:41

## 2024-10-26 RX ADMIN — ATORVASTATIN CALCIUM 80 MG: 40 TABLET, FILM COATED ORAL at 08:40

## 2024-10-26 RX ADMIN — AMLODIPINE BESYLATE 10 MG: 10 TABLET ORAL at 08:41

## 2024-10-26 RX ADMIN — LEVETIRACETAM 1000 MG: 100 INJECTION, SOLUTION INTRAVENOUS at 00:52

## 2024-10-26 RX ADMIN — DEXAMETHASONE 4 MG: 4 TABLET ORAL at 06:16

## 2024-10-26 RX ADMIN — Medication 10 ML: at 20:05

## 2024-10-26 RX ADMIN — INSULIN HUMAN 2 UNITS: 100 INJECTION, SOLUTION PARENTERAL at 12:27

## 2024-10-26 RX ADMIN — LEVETIRACETAM 750 MG: 100 INJECTION, SOLUTION INTRAVENOUS at 08:40

## 2024-10-26 RX ADMIN — LISINOPRIL 5 MG: 5 TABLET ORAL at 08:40

## 2024-10-26 RX ADMIN — INSULIN HUMAN 5 UNITS: 100 INJECTION, SOLUTION PARENTERAL at 17:49

## 2024-10-26 RX ADMIN — Medication 10 ML: at 08:41

## 2024-10-26 NOTE — PROGRESS NOTES
LOS: 2 days   Patient Care Team:  Ariel Lee DO as PCP - General (Family Medicine)    Chief Complaint: Arrhythmia    Subjective     Interval History:     Patient has not been up out of bed.  Sinus bradycardia on monitor with rare premature atrial and ventricular contractions.  Patient transferred to unit due to seizure-like activity    Review of Systems:    12 point review of systems reviewed pertinent for those mentioned HPI    Objective     Vital Signs  Temp:  [98.2 °F (36.8 °C)-99.1 °F (37.3 °C)] 98.4 °F (36.9 °C)  Heart Rate:  [46-66] 47  Resp:  [16-18] 18  BP: (112-178)/(52-99) 157/84  Body mass index is 29.73 kg/m².    Intake/Output Summary (Last 24 hours) at 10/26/2024 0942  Last data filed at 10/26/2024 0800  Gross per 24 hour   Intake 1340 ml   Output 350 ml   Net 990 ml     I/O this shift:  In: 500 [P.O.:500]  Out: -     Physical Exam:     General Appearance:  Alert, cooperative, in no acute distress   Head:  Normocephalic, without obvious abnormality, atraumatic   Eyes:  Lids and lashes normal, conjunctivae and sclerae normal, no icterus, no pallor, corneas clear, PERRLA   Ears:  Ears appear intact with no abnormalities noted   Throat:  No oral lesions, no thrush, oral mucosa moist   Neck:  No adenopathy, supple, trachea midline, no thyromegaly, no carotid bruit, no JVD   Back:  No kyphosis present, no scoliosis present, no skin lesions, erythema or scars, no tenderness to percussion or palpation, range of motion normal   Lungs:  Clear to auscultation, respirations regular, even and unlabored    Heart:  Regular rhythm and normal rate, normal S1 and S2, no murmur, no gallop, no rub, no click   Chest Wall:  No abnormalities observed   Abdomen:  Normal bowel sounds, no masses, no organomegaly, soft non-tender, non-distended, no guarding, no rebound tenderness   Rectal:  Deferred   Extremities:  Moves all extremities well, no edema, no cyanosis, no redness   Pulses:  Pulses palpable and equal  "bilaterally   Skin:  No bleeding, bruising or rash   Lymph nodes:  No palpable adenopathy   Neurologic:  Cranial nerves 2 - 12 grossly intact, sensation intact, DTR present and equal bilaterally                                                                                Results Review:     I reviewed the patient's new clinical results.  I reviewed the patient's new imaging results and agree with the interpretation.  I reviewed the patient's other test results and agree with the interpretation  I personally viewed and interpreted the patient's EKG/Telemetry data      WBC WBC   Date Value Ref Range Status   10/26/2024 11.40 (H) 3.40 - 10.80 10*3/mm3 Final   10/25/2024 10.03 3.40 - 10.80 10*3/mm3 Final   10/24/2024 12.95 (H) 3.40 - 10.80 10*3/mm3 Final   10/23/2024 16.38 (H) 3.40 - 10.80 10*3/mm3 Final      HGB Hemoglobin   Date Value Ref Range Status   10/26/2024 15.1 13.0 - 17.7 g/dL Final   10/25/2024 13.7 13.0 - 17.7 g/dL Final   10/24/2024 14.6 13.0 - 17.7 g/dL Final   10/23/2024 14.9 13.0 - 17.7 g/dL Final      HCT Hematocrit   Date Value Ref Range Status   10/26/2024 43.8 37.5 - 51.0 % Final   10/25/2024 40.4 37.5 - 51.0 % Final   10/24/2024 42.6 37.5 - 51.0 % Final   10/23/2024 45.6 37.5 - 51.0 % Final      Platlets No results found for: \"LABPLAT\"     PT/INR:    Protime   Date Value Ref Range Status   10/24/2024 13.9 12.2 - 14.5 Seconds Final   /  INR   Date Value Ref Range Status   10/24/2024 1.06 0.89 - 1.12 Final       Sodium Sodium   Date Value Ref Range Status   10/26/2024 133 (L) 136 - 145 mmol/L Final   10/25/2024 138 136 - 145 mmol/L Final   10/24/2024 141 136 - 145 mmol/L Final   10/23/2024 141 136 - 145 mmol/L Final      Potassium Potassium   Date Value Ref Range Status   10/26/2024 4.3 3.5 - 5.2 mmol/L Final     Comment:     Slight hemolysis detected by analyzer. Result may be falsely elevated.   10/25/2024 4.0 3.5 - 5.2 mmol/L Final     Comment:     Slight hemolysis detected by analyzer. Result " "may be falsely elevated.   10/24/2024 5.1 3.5 - 5.2 mmol/L Final   10/23/2024 4.6 3.5 - 5.2 mmol/L Final     Comment:     Slight hemolysis detected by analyzer. Result may be falsely elevated.      Chloride Chloride   Date Value Ref Range Status   10/26/2024 100 98 - 107 mmol/L Final   10/25/2024 105 98 - 107 mmol/L Final   10/24/2024 104 98 - 107 mmol/L Final   10/23/2024 104 98 - 107 mmol/L Final      Bicarbonate No results found for: \"PLASMABICARB\"   BUN BUN   Date Value Ref Range Status   10/26/2024 21 8 - 23 mg/dL Final   10/25/2024 23 8 - 23 mg/dL Final   10/24/2024 23 8 - 23 mg/dL Final   10/23/2024 19 8 - 23 mg/dL Final      Creatinine Creatinine   Date Value Ref Range Status   10/26/2024 1.09 0.76 - 1.27 mg/dL Final   10/25/2024 1.07 0.76 - 1.27 mg/dL Final   10/24/2024 1.14 0.76 - 1.27 mg/dL Final   10/23/2024 1.28 (H) 0.76 - 1.27 mg/dL Final      Calcium Calcium   Date Value Ref Range Status   10/26/2024 9.8 8.6 - 10.5 mg/dL Final   10/25/2024 9.2 8.6 - 10.5 mg/dL Final   10/24/2024 10.4 8.6 - 10.5 mg/dL Final   10/23/2024 9.9 8.6 - 10.5 mg/dL Final      Magnesium Magnesium   Date Value Ref Range Status   10/26/2024 2.3 1.6 - 2.4 mg/dL Final   10/25/2024 2.6 (H) 1.6 - 2.4 mg/dL Final            pH No results found for: \"PHART\"   pO2 No results found for: \"PO2ART\"   pCO2 No results found for: \"NGM8DSL\"   HCO3 No results found for: \"OAJ2GFJ\"     amLODIPine, 10 mg, Oral, Daily  aspirin, 81 mg, Oral, Daily  atorvastatin, 80 mg, Oral, Daily  [Held by provider] clopidogrel, 75 mg, Oral, Daily  dexAMETHasone, 4 mg, Oral, Q6H  insulin regular, 2-7 Units, Subcutaneous, TID With Meals  isosorbide mononitrate, 30 mg, Oral, Daily  levETIRAcetam, 750 mg, Intravenous, BID  lisinopril, 5 mg, Oral, Daily  sodium chloride, 10 mL, Intravenous, Q12H           Medication Review: Reviewed    Assessment & Plan     Patient Active Problem List   Diagnosis Code    Type 2 diabetes mellitus, without long-term current use of insulin " E11.9    HLD (hyperlipidemia) E78.5    HTN (hypertension) I10    Coronary atherosclerosis due to lipid rich plaque I25.83    Generalized weakness R53.1    Subdural hematoma, nontraumatic I62.00    Subarachnoid hemorrhage I60.9       Bradycardia/PVCs  Subdural hematoma  Coronary artery disease  Hypertension  Hyperlipidemia    Plan: Keep potassium above 4 magnesium above 2.  Continue to monitor on telemetry.  Can ambulate/be out of ICU from cardiac perspective.  Given cerebral issues expect arrhythmia/bradycardia        Sharan Headley MD  10/26/24  09:42 EDT

## 2024-10-26 NOTE — PROGRESS NOTES
HOD# : 2    No events last night      Subdural hematoma, nontraumatic    Type 2 diabetes mellitus, without long-term current use of insulin    HLD (hyperlipidemia)    HTN (hypertension)    Coronary atherosclerosis due to lipid rich plaque    Generalized weakness    Subarachnoid hemorrhage      Temp:  [98.2 °F (36.8 °C)-99.1 °F (37.3 °C)] 98.4 °F (36.9 °C)  Heart Rate:  [46-66] 52  Resp:  [16-18] 18  BP: (112-178)/(52-99) 147/78  I/O last 3 completed shifts:  In: 1320 [P.O.:1320]  Out: 350 [Urine:350]  I/O this shift:  In: 500 [P.O.:500]  Out: -   Vital signs were reviewed and documented in the chart      EXAM   Patient appeared in good neurologic function with normal comprehension   CN grossly intact  Moves all extremities to command    CT reviewed  Assessment plan    1.  Subdural hematoma    Plan  1.  Observe overnight if doing well can DC tomorrow from neurosurgical standpoint    2.  Follow-up 1 week neurosurgical clinic Dr. Jenkins with noncontrast CT head    3.  Hold Plavix 7 days    4.  Hold aspirin if neurologically stable without issues can resume 81 mg aspirin alone at discharge

## 2024-10-26 NOTE — CONSULTS
"[facility]  Consult Note    Patient Name: Manjit Thomas  : 1955  MRN: [id number]  Primary Care Physician:  Ariel Lee DO  Referring Physician: David Serrano DO  Date of admission: 10/23/2024    Subjective     Reason for Consult: Seizure, needs EEG    Manjit Thomas is a 69 y.o. male with history of B12 deficiency, hypertension, hyperlipidemia, CAD who is presenting with subdural hematoma.  Neurology consulted for possible seizure.    Patient presented to [facility] ED on 10/23 from [facility] with a right subdural hematoma.  He had a headache for 3 weeks, intermittent numbness and weakness of his lower extremity.  He was on aspirin and Plavix.  He was transferred to [facility] for neurosurgical evaluation.    On 10/24 patient underwent MMA embolization.  He was started on prophylactic Keppra and Decadron.  Early in the morning of 10/26 patient had 2 episodes of seizure-like activity within an hour.  He was aware of the event, no incontinence and no tongue bite.  He did not appear postictal.  Wife witnessed both events.  Patient said he felt his hand go numb and his tongue becomes thick.  Wife states he then was \"flopping around like a fish\", throwing his arms around in a nonrhythmic fashion.  He states he was able to hear everything going on around him, but wife said he did not respond.  For the second event he said his mouth got sick again, mumbled speech, and the left side of his face started to draw.  Wife said his she had rhythmic jerking.    CT head showed slight interval decrease in the size of the subdural with improved mass effect and midline shift.  He was transferred to the ICU for further observation.  No further episodes concerning for seizure since transfer.    Review Of Systems   Constitutional:   Cardiovascular: Negative for chest pain or palpitations.  Respiratory: Negative for shortness of breath or cough.  Gastrointestinal: Negative for nausea and vomiting.  Genitourinary: " Negative for bladder incontinence.  Musculoskeletal: Negative for aches and pains in the muscles or joints.  Dermatological: Negative for skin breakdown.   Neurological: Negative for headache, pain, weakness or vision changes.     Personal History     Past Medical History:   Diagnosis Date    Coronary atherosclerosis     Diabetes mellitus     Headache     HLD (hyperlipidemia)     Hypertension     Vitamin B12 deficiency        Past Surgical History:   Procedure Laterality Date    CORONARY ANGIOPLASTY WITH STENT PLACEMENT N/A        Family History: family history includes CABG in his brother; Coronary artery disease in his brother; Heart attack in his father. Otherwise pertinent FHx was reviewed and not pertinent to current issue.    Social History:  reports that he has never smoked. He has never used smokeless tobacco. He reports that he does not drink alcohol and does not use drugs.    Home Medications:   amLODIPine, aspirin, atorvastatin, clopidogrel, dapagliflozin Propanediol, fluticasone, folic acid, isosorbide mononitrate, lisinopril, and vitamin B-12    Current Medications:     Current Facility-Administered Medications:     amLODIPine (NORVASC) tablet 10 mg, 10 mg, Oral, Daily, Manjit Jenkins MD, 10 mg at 10/26/24 0841    aspirin EC tablet 81 mg, 81 mg, Oral, Daily, Manjit Jenkins MD, 81 mg at 10/26/24 0841    atorvastatin (LIPITOR) tablet 80 mg, 80 mg, Oral, Daily, Manjit Jenkins MD, 80 mg at 10/26/24 0840    sennosides-docusate (PERICOLACE) 8.6-50 MG per tablet 2 tablet, 2 tablet, Oral, BID PRN **AND** polyethylene glycol (MIRALAX) packet 17 g, 17 g, Oral, Daily PRN **AND** bisacodyl (DULCOLAX) EC tablet 5 mg, 5 mg, Oral, Daily PRN **AND** bisacodyl (DULCOLAX) suppository 10 mg, 10 mg, Rectal, Daily PRN, Manjit Jenkins MD    Calcium Replacement - Follow Nurse / BPA Driven Protocol, , Does not apply, Gregory SALDANA Charles Benjamin, MD    [Held by provider] clopidogrel  (PLAVIX) tablet 75 mg, 75 mg, Oral, Daily, Arpita Fernandez APRN    dexAMETHasone (DECADRON) tablet 4 mg, 4 mg, Oral, Q6H, Priya Lynn PA-C, 4 mg at 10/26/24 0616    dextrose (D50W) (25 g/50 mL) IV injection 25 g, 25 g, Intravenous, Q15 Min PRN, Manjit Jenkins MD    dextrose (GLUTOSE) oral gel 15 g, 15 g, Oral, Q15 Min PRN, Manjit Jenkins MD    glucagon (GLUCAGEN) injection 1 mg, 1 mg, Intramuscular, Q15 Min PRN, Manjit Jenkins MD    hydrALAZINE (APRESOLINE) injection 20 mg, 20 mg, Intravenous, Q4H PRN, Ariel Stone APRN    insulin regular (humuLIN R,novoLIN R) injection 2-7 Units, 2-7 Units, Subcutaneous, TID With Meals, Vaishnavi Berumen PA-C    isosorbide mononitrate (IMDUR) 24 hr tablet 30 mg, 30 mg, Oral, Daily, Manjit Jenkins MD, 30 mg at 10/26/24 0840    levETIRAcetam (KEPPRA) injection 750 mg, 750 mg, Intravenous, BID, Lico Cohen PA-C, 750 mg at 10/26/24 0840    lisinopril (PRINIVIL,ZESTRIL) tablet 5 mg, 5 mg, Oral, Daily, Manjit Jenkins MD, 5 mg at 10/26/24 0840    Magnesium Standard Dose Replacement - Follow Nurse / BPA Driven Protocol, , Does not apply, PRN, Manjit Jenkins MD    melatonin tablet 5 mg, 5 mg, Oral, Nightly PRN, Manjit Jenkins MD    midazolam (VERSED) injection 2 mg, 2 mg, Intravenous, Q30 Min PRN, Felicia Moody APRN    nitroglycerin (NITROSTAT) SL tablet 0.4 mg, 0.4 mg, Sublingual, Q5 Min PRN, Manjit Jenkins MD    ondansetron ODT (ZOFRAN-ODT) disintegrating tablet 4 mg, 4 mg, Oral, Q6H PRN **OR** ondansetron (ZOFRAN) injection 4 mg, 4 mg, Intravenous, Q6H PRN, Manjit Jenkins MD    Phosphorus Replacement - Follow Nurse / BPA Driven Protocol, , Does not apply, PRNGregory Charles Benjamin, MD    Potassium Replacement - Follow Nurse / BPA Driven Protocol, , Does not apply, Gregory SALDANA Charles Benjamin, MD    sodium chloride 0.9 % flush 10 mL, 10 mL, Intravenous, Q12H,  "Manjit Jenkins MD, 10 mL at 10/26/24 0841    sodium chloride 0.9 % flush 10 mL, 10 mL, Intravenous, PRN, Manjit Jenkins MD     Allergies:  Allergies   Allergen Reactions    Benzodiazepines     Codeine Nausea Only     Other Reaction(s) from [facility]: Nausea    Phenobarbital Itching       Objective     Vitals:  Temp:  [98.2 °F (36.8 °C)-99.1 °F (37.3 °C)] 98.4 °F (36.9 °C)  Heart Rate:  [46-66] 47  Resp:  [16-18] 18  BP: (112-178)/(52-99) 157/84  Flow (L/min) (Oxygen Therapy):  [2] 2    Neurologic Exam   Mental status/Cognition: Awake, alert, oriented to self place month and year  Speech/language: fluent; follows commands    Cranial nerves: PERRL. EOMI. Face appears symmetric. No dysarthria.  Shoulder shrug symmetric.  Tongue protrudes midline    Motor: No drift in any extremities, able to raise all to antigravity.    Sensation: intact to light touch throughout    Coordination/Complex Motor: Finger-to-nose intact bilaterally without dysmetria        Laboratory Results:   Lab Results   Component Value Date    GLUCOSE 160 (H) 10/26/2024    CALCIUM 9.8 10/26/2024     (L) 10/26/2024    K 4.3 10/26/2024    CO2 22.0 10/26/2024     10/26/2024    BUN 21 10/26/2024    CREATININE 1.09 10/26/2024    BCR 19.3 10/26/2024    ANIONGAP 11.0 10/26/2024     Lab Results   Component Value Date    WBC 11.40 (H) 10/26/2024    HGB 15.1 10/26/2024    HCT 43.8 10/26/2024    MCV 86.2 10/26/2024     10/26/2024     No results found for: \"CHOL\"  No results found for: \"HDL\"  No results found for: \"LDL\"  No results found for: \"TRIG\"  Lab Results   Component Value Date    HGBA1C 5.70 (H) 10/24/2024     No results found for: \"NVIMADDV04\"  No results found for: \"FOLATE\"    CK 81  Lactate 2.0  Pro-Yash 0.04    Images/Procedures   CT head 10/23/2024  Acute on chronic right subdural measuring 1.7 cm maximum thickness.  Acute on chronic left subdural measuring up to 0.5 cm.  Right to left midline shift 1 " cm.    CT head 10/26/2024  Slight interval decrease in size of the subdural collection along the right cerebral convexity with improvement in surrounding mass effect and right-to-left midline shift.  Assessment / Plan   Active Hospital Problems:  Acute on chronic right subdural hematoma  Acute on chronic left subdural hematoma  Focal seizure    Brief Patient Summary:  Manjit Thomas is a 69 y.o. male with history of B12 deficiency, hypertension, hyperlipidemia, CAD who is presenting with subdural hematoma.  Neurology consulted for possible seizure.  Initial CT head showed an acute on chronic right subdural measuring 1.7 cm in maximum thickness with surrounding edema and right-to-left midline shift.  He underwent MMA embolization on 10/24 without complications.  He was started on Keppra and Decadron prophylactically.  Early in the morning on 10/26 patient had episodes of seizure-like activity.     First episode as described by his wife does not sound consistent with seizure activity, however the second episode of his face rhythmically jerking sounds concerning for a focal seizure.  It also correlates with his recent right-sided subdural.  Recommend continuing Keppra and Decadron.  For now we will have patient follow-up with neurosurgery for continued management of his Keppra.  If his seizures become more frequent or uncontrolled, recommend outpatient referral to general neurology for further management.  We discussed seizure precautions and patient verbalized understanding.  No further workup, patient is okay to discharge home from a neurology perspective      Plan:   -Continue Keppra 750 mg twice daily  -Decadron per neurosurgery  -Seizure precaution  -Patient is okay to discharge home from a neurology perspective  -No need for general neurology follow-up at this time, if seizures become uncontrolled recommend referral for seizure management    The patient was given instructions regarding safety in persons with  seizures, specifically including no driving for 90 days, no taking tub baths, no climbing heights or swimming due to risk of injury from seizures.      I have discussed the above with the patient, family, bedside RN  Time spent with patient: 45 minutes in face-to-face evaluation and management of the patient.       Chad Donahue, DO

## 2024-10-26 NOTE — PLAN OF CARE
Goal Outcome Evaluation:               No seizure activity per night nurse.                               Anxious

## 2024-10-26 NOTE — PROGRESS NOTES
"  CRITICAL CARE ADMISSION NOTE     Patient's name Manjit Thomas MRN: [id number] : 1955-69 y.o.-male  Admission date 10/23/2024  Length of stay 2    REASON FOR CONSULTATION / MAIN COMPLAINT  No chief complaint on file.     HISTORY OF MAIN COMPLAINT    Manjit Thomas is a 69 y.o. male with significant medical history of HTN, HLD, coronary atherosclerosis, s/p angioplasty with stent on Plavix, nephrolithiasis, and vitamin B12 deficiency presents to [facility] as a transfer from [facility] after CT reveals acute on chronic subdermal hematoma, requiring a higher level of care.                                                                                           -End of copied text-    On admission, the patient was started on Decadron and prophylactic Keppra.  The patient underwent a MMA embolization for bilateral subdural hematomas by Dr. Jenkins on 10/24/24. No complications were noted. Neurosurgery recommended the patient be discharged home on Keppra and a Decadron taper. Early morning of 10/26/24, the patient had two episodes of \"seizure-like\" activity within an hour. RRT was called. When the ICU team arrived to the bedside, he was oriented x4 and aware that a \"seizure\" occurred. The patient was not incontinent and did not appear postictal. STAT CT head ordered and showed slight interval decrease in size of the subdural collection along the right cerebral convexity with improvement in surrounding mass effect and right to left midline shift. No new area of hemorrhage identified. No acute process identified.  The patient will be transferred to the ICU for closer observation.         Time Spent: 9 minutes  Electronically signed by JOSÉ MIGUEL Cordova, 10/26/24, 1:51 AM EDT.         PAST MEDICAL HISTORY:  Past Medical History:   Diagnosis Date    Coronary atherosclerosis     Diabetes mellitus     Headache     HLD (hyperlipidemia)     Hypertension     Vitamin B12 deficiency        PAST " SURGICAL HISTORY:  Past Surgical History:   Procedure Laterality Date    CORONARY ANGIOPLASTY WITH STENT PLACEMENT N/A        FAMILY HISTORY:  Family History   Problem Relation Age of Onset    Heart attack Father     Coronary artery disease Brother     Other (CABG) Brother        SOCIAL HISTORY:  Social History     Tobacco Use    Smoking status: Never    Smokeless tobacco: Never   Vaping Use    Vaping status: Never Used   Substance Use Topics    Alcohol use: Never    Drug use: Never       ALLERGIES:  Allergies   Allergen Reactions    Codeine Nausea Only     Other Reaction(s) from [facility]: Nausea    Phenobarbital Itching       HOME MEDS INCLUDE  Current Outpatient Medications   Medication Instructions    amLODIPine (NORVASC) 10 mg, Oral, Daily    aspirin 81 mg, Oral, Daily    atorvastatin (LIPITOR) 80 mg, Oral, Daily    clopidogrel (PLAVIX) 75 mg, Oral, Daily    dapagliflozin Propanediol (FARXIGA) 10 mg, Oral, Daily    fluticasone (FLONASE) 50 MCG/ACT nasal spray 2 sprays, Nasal, Daily    folic acid (FOLVITE) 1 mg, Oral, Daily    isosorbide mononitrate (IMDUR) 30 mg, Oral, Daily    lisinopril (PRINIVIL,ZESTRIL) 2.5 mg, Oral, Daily    vitamin B-12 (CYANOCOBALAMIN) 1,000 mcg, Oral, Daily       SCHEDULED MEDS:  acetaminophen, 1,000 mg, Oral, TID  amLODIPine, 10 mg, Oral, Daily  aspirin, 81 mg, Oral, Daily  atorvastatin, 80 mg, Oral, Daily  [Held by provider] clopidogrel, 75 mg, Oral, Daily  dexAMETHasone, 4 mg, Oral, Q6H  insulin regular, 2-7 Units, Subcutaneous, TID With Meals  isosorbide mononitrate, 30 mg, Oral, Daily  levETIRAcetam, 750 mg, Intravenous, BID  lisinopril, 5 mg, Oral, Daily  sodium chloride, 10 mL, Intravenous, Q12H         CONTINUOUS INFUSIONS:        REVIEW OF SYSTEMS:    Constitutional - neg for fever, chills, night sweats, weight loss.  HEENT - neg for facial swelling, nasal congestion, ear discharge, neg for for discharge and redness.  Respiratory - neg for cough and shortness of  "breath.  Cardiovascular - neg for chest pain.  Gastrointestinal - neg for nausea, vomiting, abdominal pain and diarrhea.  Genitourinary - neg for dysuria and hematuria.  Musculoskeletal - neg for joint swelling, muscle pains.  Cutaneous - neg for rash.  Neurological - neg for syncope, weakness, gait problems.  Hematological - neg bruising/easy bleeding  Psychiatric/Behavioral - neg anxiety    PHYSICAL EXAMINATION:  /99 (BP Location: Left arm, Patient Position: Lying)   Pulse 57   Temp 99.1 °F (37.3 °C) (Oral)   Resp 18   Ht 180.3 cm (71\")   Wt 96.7 kg (213 lb 3 oz)   SpO2 94%   BMI 29.73 kg/m²     General appearance: ill appearing  Trachea: midline  Lymphatic: no lymphadenopathy.  Chest: symmetric, clear to auscultation bilaterally  Heart: regular rate and rhythm, S1, S2 normal  Gastrointestinal:soft, non-tender  Genitourinary: No CVA tenderness BL, no suprapubic tenderness  Neurological: Alert, awake, grossly non focal  Psychiatric: appropriate mood and affect  Skin: no rash    DATA REVIEW:  1. Medical chart reviewed in detail  2. I reviewed the actual EKG rhythm strips and Pulse Oximetry data on the monitors  3. I reviewed today's lab values and the report of the most recent Chest X ray.  4. In addition, I have independently reviewed the actual image of the most recent chest Xray    Hematology:  Results from last 7 days   Lab Units 10/25/24  0558 10/24/24  0807 10/23/24  1853   WBC 10*3/mm3 10.03 12.95* 16.38*   HEMOGLOBIN g/dL 13.7 14.6 14.9   HEMATOCRIT % 40.4 42.6 45.6   MCV fL 88.8 87.5 89.9   PLATELETS 10*3/mm3 234 259 260     Chemistry:  Estimated Creatinine Clearance: 77.3 mL/min (by C-G formula based on SCr of 1.07 mg/dL).  Results from last 7 days   Lab Units 10/25/24  0558 10/24/24  0808   SODIUM mmol/L 138 141   POTASSIUM mmol/L 4.0 5.1   CHLORIDE mmol/L 105 104   CO2 mmol/L 21.0* 24.0   BUN mg/dL 23 23   CREATININE mg/dL 1.07 1.14   GLUCOSE mg/dL 105* 102*     Results from last 7 days " "  Lab Units 10/25/24  0558 10/24/24  0808   CALCIUM mg/dL 9.2 10.4   MAGNESIUM mg/dL 2.6*  --      Hepatic Panel:  Results from last 7 days   Lab Units 10/24/24  0808 10/23/24  1746   ALBUMIN g/dL 4.1 4.3   TOTAL PROTEIN g/dL 6.8 7.2   BILIRUBIN mg/dL 0.6 0.5   AST (SGOT) U/L 22 24   ALT (SGPT) U/L 25 28   ALK PHOS U/L 72 77     Coagulation Labs:  Results from last 7 days   Lab Units 10/24/24  0808   PROTIME Seconds 13.9   INR  1.06      Cardiac Labs:  Results from last 7 days   Lab Units 10/23/24  2012 10/23/24  1746   HSTROP T ng/L 12 7     Biomarkers:      U/A          Arterial Blood Gases:      Microbiology:  No results found for: \"BLOODCX\", \"CULTURES\", \"THROATCX\", \"URINECX\", \"STOOLCX\", \"WOUNDCX\"    Images:  No radiology results for the last day    Echo:        ASSESSMENT & PLAN     Subdural hematoma, nontraumatic    Type 2 diabetes mellitus, without long-term current use of insulin    HLD (hyperlipidemia)    HTN (hypertension)    Coronary atherosclerosis due to lipid rich plaque    Generalized weakness    Subarachnoid hemorrhage       *Seizure-like activities  Patient is a remembers all of his seizures and his wife calling out to him  Patient did not soil himself nor did he bite his tongue  *Bilateral subdural hematoma status post MMA embolization  *Coronary artery disease  *Hypertension  *Hyperlipidemia    Recommendations:  Although do not suspect that the patient had a true seizure, we will bring the patient to the ICU for close monitoring  Neurochecks every hour  CT scan showed interval improvement of subdural collections.  Patient loaded with 1 g of Keppra and started on twice daily dosing Keppra 750 mg  Currently on Decadron    I have spent a total of 60 critical care minutes in the management of this patient, apart from any time taken to perform necessary procedures. Critical care time includes time reviewing chart, images, report of images, rounding with nurse, respiratory therapist and pharmacist, and " discussions with available family at bedside.    Electronically signed: Jules Hudson MD  10/26/2024 02:10 EDT      Bowel regimen:  Diet: Diet: Diabetic; Consistent Carbohydrate; Fluid Consistency: Thin (IDDSI 0)  GI ppx:   DVT PPX: VTE Prophylaxis:  Mechanical VTE prophylaxis orders are present.       Advance Directives: Level Of Support Discussed With: Patient  Code Status (Patient has no pulse and is not breathing): CPR (Attempt to Resuscitate)  Medical Interventions (Patient has pulse or is breathing): Full Support       Dispo:ICU

## 2024-10-27 ENCOUNTER — APPOINTMENT (OUTPATIENT)
Dept: CT IMAGING | Facility: HOSPITAL | Age: 69
DRG: 022 | End: 2024-10-27
Payer: MEDICARE

## 2024-10-27 ENCOUNTER — READMISSION MANAGEMENT (OUTPATIENT)
Dept: CALL CENTER | Facility: HOSPITAL | Age: 69
End: 2024-10-27
Payer: MEDICARE

## 2024-10-27 VITALS
BODY MASS INDEX: 29.85 KG/M2 | DIASTOLIC BLOOD PRESSURE: 77 MMHG | OXYGEN SATURATION: 92 % | RESPIRATION RATE: 18 BRPM | SYSTOLIC BLOOD PRESSURE: 153 MMHG | HEART RATE: 50 BPM | HEIGHT: 71 IN | WEIGHT: 213.19 LBS | TEMPERATURE: 96.8 F

## 2024-10-27 LAB
ANION GAP SERPL CALCULATED.3IONS-SCNC: 10 MMOL/L (ref 5–15)
BASOPHILS # BLD AUTO: 0.02 10*3/MM3 (ref 0–0.2)
BASOPHILS NFR BLD AUTO: 0.1 % (ref 0–1.5)
BUN SERPL-MCNC: 22 MG/DL (ref 8–23)
BUN/CREAT SERPL: 20 (ref 7–25)
CALCIUM SPEC-SCNC: 9.9 MG/DL (ref 8.6–10.5)
CHLORIDE SERPL-SCNC: 104 MMOL/L (ref 98–107)
CO2 SERPL-SCNC: 24 MMOL/L (ref 22–29)
CREAT SERPL-MCNC: 1.1 MG/DL (ref 0.76–1.27)
DEPRECATED RDW RBC AUTO: 43.1 FL (ref 37–54)
EGFRCR SERPLBLD CKD-EPI 2021: 72.7 ML/MIN/1.73
EOSINOPHIL # BLD AUTO: 0 10*3/MM3 (ref 0–0.4)
EOSINOPHIL NFR BLD AUTO: 0 % (ref 0.3–6.2)
ERYTHROCYTE [DISTWIDTH] IN BLOOD BY AUTOMATED COUNT: 13.8 % (ref 12.3–15.4)
GLUCOSE BLDC GLUCOMTR-MCNC: 115 MG/DL (ref 70–130)
GLUCOSE SERPL-MCNC: 125 MG/DL (ref 65–99)
HCT VFR BLD AUTO: 39.7 % (ref 37.5–51)
HGB BLD-MCNC: 13.8 G/DL (ref 13–17.7)
IMM GRANULOCYTES # BLD AUTO: 0.17 10*3/MM3 (ref 0–0.05)
IMM GRANULOCYTES NFR BLD AUTO: 1 % (ref 0–0.5)
LYMPHOCYTES # BLD AUTO: 1.47 10*3/MM3 (ref 0.7–3.1)
LYMPHOCYTES NFR BLD AUTO: 8.4 % (ref 19.6–45.3)
MCH RBC QN AUTO: 29.8 PG (ref 26.6–33)
MCHC RBC AUTO-ENTMCNC: 34.8 G/DL (ref 31.5–35.7)
MCV RBC AUTO: 85.7 FL (ref 79–97)
MONOCYTES # BLD AUTO: 1.37 10*3/MM3 (ref 0.1–0.9)
MONOCYTES NFR BLD AUTO: 7.8 % (ref 5–12)
NEUTROPHILS NFR BLD AUTO: 14.45 10*3/MM3 (ref 1.7–7)
NEUTROPHILS NFR BLD AUTO: 82.7 % (ref 42.7–76)
NRBC BLD AUTO-RTO: 0 /100 WBC (ref 0–0.2)
PLATELET # BLD AUTO: 271 10*3/MM3 (ref 140–450)
PMV BLD AUTO: 11.8 FL (ref 6–12)
POTASSIUM SERPL-SCNC: 4.4 MMOL/L (ref 3.5–5.2)
QT INTERVAL: 416 MS
QTC INTERVAL: 416 MS
RBC # BLD AUTO: 4.63 10*6/MM3 (ref 4.14–5.8)
SODIUM SERPL-SCNC: 138 MMOL/L (ref 136–145)
WBC NRBC COR # BLD AUTO: 17.48 10*3/MM3 (ref 3.4–10.8)

## 2024-10-27 PROCEDURE — 25010000002 LEVETRIRACETAM PER 10 MG: Performed by: PHYSICIAN ASSISTANT

## 2024-10-27 PROCEDURE — 85025 COMPLETE CBC W/AUTO DIFF WBC: CPT | Performed by: INTERNAL MEDICINE

## 2024-10-27 PROCEDURE — 80048 BASIC METABOLIC PNL TOTAL CA: CPT | Performed by: INTERNAL MEDICINE

## 2024-10-27 PROCEDURE — 99239 HOSP IP/OBS DSCHRG MGMT >30: CPT

## 2024-10-27 PROCEDURE — 99232 SBSQ HOSP IP/OBS MODERATE 35: CPT | Performed by: INTERNAL MEDICINE

## 2024-10-27 PROCEDURE — 82948 REAGENT STRIP/BLOOD GLUCOSE: CPT

## 2024-10-27 PROCEDURE — 70450 CT HEAD/BRAIN W/O DYE: CPT

## 2024-10-27 RX ORDER — LEVETIRACETAM 750 MG/1
750 TABLET ORAL 2 TIMES DAILY
Qty: 60 TABLET | Refills: 2 | Status: SHIPPED | OUTPATIENT
Start: 2024-10-27 | End: 2025-01-25

## 2024-10-27 RX ORDER — LISINOPRIL 5 MG/1
5 TABLET ORAL DAILY
Qty: 90 TABLET | Refills: 0 | Status: SHIPPED | OUTPATIENT
Start: 2024-10-28 | End: 2024-11-08 | Stop reason: HOSPADM

## 2024-10-27 RX ORDER — ASPIRIN 81 MG/1
81 TABLET ORAL DAILY
Start: 2024-10-28 | End: 2024-11-08 | Stop reason: HOSPADM

## 2024-10-27 RX ADMIN — ISOSORBIDE MONONITRATE 30 MG: 30 TABLET, EXTENDED RELEASE ORAL at 08:17

## 2024-10-27 RX ADMIN — LISINOPRIL 5 MG: 5 TABLET ORAL at 08:17

## 2024-10-27 RX ADMIN — Medication 10 ML: at 08:19

## 2024-10-27 RX ADMIN — LEVETIRACETAM 750 MG: 100 INJECTION, SOLUTION INTRAVENOUS at 08:19

## 2024-10-27 RX ADMIN — ATORVASTATIN CALCIUM 80 MG: 40 TABLET, FILM COATED ORAL at 08:17

## 2024-10-27 RX ADMIN — AMLODIPINE BESYLATE 10 MG: 10 TABLET ORAL at 08:17

## 2024-10-27 NOTE — DISCHARGE SUMMARY
DISCHARGE SUMMARY       Patient name: Manjit Thomas  CSN: [id number]  MRN: [id number]  : 1955    Date of Admission: 10/23/2024  Date of Discharge:  10/27/2024    Admitting Physician:  John Bauman III, DO   Primary Care Provider: Ariel Lee DO  Consultations:   Manjit Jenkins MD Neurosurgery  Lisbet Agosto MD  Cardiology  Chad Donahue DO   Stroke Neurology    Admission Diagnosis:   Nontraumatic subdural hematoma     Discharge Diagnoses:     Subdural hematoma, nontraumatic    Type 2 diabetes mellitus, without long-term current use of insulin    HLD (hyperlipidemia)    HTN (hypertension)    Coronary atherosclerosis due to lipid rich plaque    Generalized weakness    Subarachnoid hemorrhage      Procedures:  Procedure(s):  10/24/24 [facility] Emoblization     Imaging:  CT Head Without Contrast    Result Date: 10/27/2024  Impression: 1. Stable appearance of bilateral hyperdense subdural hematomas. There is 5 mm of midline shift from right to left which is unchanged. Electronically Signed: Segundo Wyatt MD  10/27/2024 10:13 AM EDT  Workstation ID: [id number]    CT Head Without Contrast    Result Date: 10/26/2024  Impression: Slight interval decrease in size of the subdural collection along the right cerebral convexity with improvement in surrounding mass effect and right to left midline shift. No new area of hemorrhage identified. No acute process identified. Electronically Signed: Pretty Rossi MD  10/26/2024 1:45 AM EDT  Workstation ID: [id number]    CT Head Without Contrast    Result Date: 10/23/2024  1. Acute on chronic right subdural hematoma measuring 1.7 cm maximum thickness. Acute on chronic left-sided subdural hematoma measuring up to 0.5 cm. Right-to-left midline shift 1 cm. 2. Sinuses appear unremarkable.  Report called to Dr. Serrano at 347PM EST 10/23/2024.   This report was finalized on 10/23/2024 6:50 PM by Alex Pallas, DO.      CT Sinus Without Contrast    Result  Date: 10/23/2024  1. Acute on chronic right subdural hematoma measuring 1.7 cm maximum thickness. Acute on chronic left-sided subdural hematoma measuring up to 0.5 cm. Right-to-left midline shift 1 cm. 2. Sinuses appear unremarkable.  Report called to Dr. Serrano at 347PM EST 10/23/2024.   This report was finalized on 10/23/2024 6:50 PM by Alex Pallas, DO.       History of Present Illness:  Manjit Thomas is a 69 y.o. male with significant medical history of HTN, HLD, coronary atherosclerosis, s/p angioplasty with stent on Plavix, nephrolithiasis, and vitamin B12 deficiency presents to [facility] transfer from [facility] after CT reveals acute on chronic subdermal hematoma, requiring a higher level of care.     Patient states he has had headache for approximately 3 weeks.  Pain increases with standing, laying down, coughing or sneezing.  Patient describes intermittent episodes of weakness, numbness, and tingling of his lower extremities.  He sought medical assistance at [facility] due to progressively worsening headache.  He did have an episode of nausea vomiting today during transport from [place] to [place], now resolved.  He denies photophobia or visual disturbances.  He denies difficulty swallowing or slurred speech.  He denies head injury or trauma.  Patient is alert and oriented x 4 follows commands.  He denies chest pain or shortness of breath.  No reported fevers chills or diarrhea.  He is resting comfortably in bed with no complaints currently.    (End of copied text).    Hospital Course:  Patient admitted to Hospital Medicine 2* the above listed problems in the HPI. Neurosurgery consulted and patient underwent right MMA embolization with Dr. Jenkins. Prophylactic Keppra and Decadron started. Plavix/ASA held.    Patient developed bradycardia and junctional rhythm following embolization. Cardiology consulted who felt it was trigeminocardiac reflex as source.    On 10/26, seizure-like  "activity seen. Patient brought to ICU for closer monitoring and Neurology consulted who recommended continuing Keppra.    Remaining duration of the patient's stay has been uneventful. It has been determined by all primary and consulting parties that the patient has reached the maximum benefit of his stay and is ready for discharge home today. He is hemodynamically stable and tolerating PO intake. Ambulating well. He will follow-up with Dr. Jenkins in a week and he will reassess Plavix at that point.     Vitals:  /70   Pulse 52   Temp 96.8 °F (36 °C) (Axillary)   Resp 18   Ht 180.3 cm (71\")   Wt 96.7 kg (213 lb 3 oz)   SpO2 93%   BMI 29.73 kg/m²     Physical Exam:  Constitutional:  Appears well-developed and well-nourished. NAD.   HEENT:  Normocephalic and atraumatic. PERRL  Neck: Normal range of motion. Neck supple. No JVD present.   Cardiovascular: Bradycardic rate, regular rhythm. S1S2. No gallop and no friction rub.  No murmur heard.  Pulmonary/Chest: Effort normal and breath sounds normal. No respiratory distress. No wheezes, rhonchi or rales.   Abdominal: Soft. No distension and no mass. There is no tenderness.   Musculoskeletal: Normal range of motion.   Neurological: Alert and oriented to person, place, and time.  No focal deficits  Skin: Skin is warm and dry. No rash noted.   Extremities:  No clubbing, edema or cyanosis  Psychiatric: Normal mood and affect. Behavior is normal.     Labs:  Results from last 7 days   Lab Units 10/27/24  0327   WBC 10*3/mm3 17.48*   HEMOGLOBIN g/dL 13.8   HEMATOCRIT % 39.7   PLATELETS 10*3/mm3 271     Results from last 7 days   Lab Units 10/27/24  0327 10/25/24  0558 10/24/24  0808   SODIUM mmol/L 138   < > 141   POTASSIUM mmol/L 4.4   < > 5.1   CHLORIDE mmol/L 104   < > 104   CO2 mmol/L 24.0   < > 24.0   BUN mg/dL 22   < > 23   CREATININE mg/dL 1.10   < > 1.14   CALCIUM mg/dL 9.9   < > 10.4   BILIRUBIN mg/dL  --   --  0.6   ALK PHOS U/L  --   --  72   ALT (SGPT) U/L "  --   --  25   AST (SGOT) U/L  --   --  22   GLUCOSE mg/dL 125*   < > 102*    < > = values in this interval not displayed.         Magnesium   Date Value Ref Range Status   10/26/2024 2.3 1.6 - 2.4 mg/dL Final   10/25/2024 2.6 (H) 1.6 - 2.4 mg/dL Final           Discharge Medications:     Discharge Medications        New Medications        Instructions Start Date   levETIRAcetam 750 MG tablet  Commonly known as: Keppra   750 mg, Oral, 2 Times Daily             Changes to Medications        Instructions Start Date   lisinopril 5 MG tablet  Commonly known as: PRINIVIL,ZESTRIL  What changed: how much to take   5 mg, Oral, Daily   Start Date: October 28, 2024            Continue These Medications        Instructions Start Date   amLODIPine 10 MG tablet  Commonly known as: NORVASC   10 mg, Oral, Daily      aspirin 81 MG EC tablet   81 mg, Oral, Daily   Start Date: October 28, 2024     atorvastatin 20 MG tablet  Commonly known as: LIPITOR   80 mg, Oral, Daily      Farxiga 10 MG tablet  Generic drug: dapagliflozin Propanediol   10 mg, Oral, Daily      fluticasone 50 MCG/ACT nasal spray  Commonly known as: FLONASE   2 sprays, Nasal, Daily      folic acid 1 MG tablet  Commonly known as: FOLVITE   1 mg, Oral, Daily      isosorbide mononitrate 30 MG 24 hr tablet  Commonly known as: IMDUR   30 mg, Oral, Daily      vitamin B-12 1000 MCG tablet  Commonly known as: CYANOCOBALAMIN   1,000 mcg, Oral, Daily             Stop These Medications      clopidogrel 75 MG tablet  Commonly known as: PLAVIX              Discharge Diet:   Diet Instructions       Diet: Regular/House Diet, Diabetic Diets; Consistent Carbohydrate; Thin (IDDSI 0)      Discharge Diet:  Regular/House Diet  Diabetic Diets       Diabetic Diet: Consistent Carbohydrate    Fluid Consistency: Thin (IDDSI 0)            Activity at Discharge:    Activity Instructions       Activity as Tolerated              Follow-up Appointments  No future appointments.  Additional  Instructions for the Follow-ups that You Need to Schedule       Discharge Follow-up with PCP   As directed       Currently Documented PCP:    Ariel Lee DO    PCP Phone Number:    [phone]     Follow Up Details: 1 week        Discharge Follow-up with Specified Provider: Dr. Gomez; 6 Weeks   As directed      To: Dr. Gomez   Follow Up: 6 Weeks   Follow Up Details: Cardiologist 6-8 weeks        Discharge Follow-up with Specified Provider: Gregory; 1 Week   As directed      To: Gregory   Follow Up: 1 Week                Discharge Instructions:  Ok for d/c  Medications as above  Restart ASA tomorrow  Continue Keppra  Hold Plavix until f/u with Dr. Jenkins  Follow-ups as above  Cardiologist Dr. Gomez in [place] 6-8 weeks  If seizures become uncontrolled, will need referral for Neurology  1 week follow-up with Dr. Jenkins   PCP 1 week  Further d/c instructions as above  Please call 911 or proceed to nearest ED if symptoms worsen or reoccur    Current Code Status       Date Active Code Status Order ID Comments User Context       10/24/2024 0054 CPR (Attempt to Resuscitate) [id number]  Arpita Fernandez APRN Inpatient        Question Answer    Code Status (Patient has no pulse and is not breathing) CPR (Attempt to Resuscitate)    Medical Interventions (Patient has pulse or is breathing) Full Support    Level Of Support Discussed With Patient                     Neris Morton, MSN, APRN, [facility]  Pulmonary and Critical Care Medicine  10/27/24     Time: Discharge 33 min    CC: Ariel Lee DO    Please note that portions of this note were completed with a voice recognition program.

## 2024-10-27 NOTE — OUTREACH NOTE
Prep Survey      Flowsheet Row Responses   [facility] facility patient discharged from? [facility]   Is LACE score < 7 ? No   Eligibility Readm Mgmt   Discharge diagnosis Subdural hematoma, nontraumatic, MMA Emoblization   Does the patient have one of the following disease processes/diagnoses(primary or secondary)? Other   Does the patient have Home health ordered? No   Is there a DME ordered? No   Prep survey completed? Yes            Laila LLAMAS - Registered Nurse

## 2024-10-27 NOTE — PROGRESS NOTES
HOD# : 3    No events last night  Patient had a little head shaking episode that lasted a couple seconds but did not have any sided seizure.    Dr. Donahue was not convinced that there is any seizure activity to begin with.    Patient has been told to stay on his Keppra.    Groin site looks intact patient is doing well from a neurosurgical standpoint    Subdural hematoma, nontraumatic    Type 2 diabetes mellitus, without long-term current use of insulin    HLD (hyperlipidemia)    HTN (hypertension)    Coronary atherosclerosis due to lipid rich plaque    Generalized weakness    Subarachnoid hemorrhage      Temp:  [96.8 °F (36 °C)-98.8 °F (37.1 °C)] 96.8 °F (36 °C)  Heart Rate:  [41-59] 48  Resp:  [14-18] 18  BP: (110-172)/() 158/80  I/O last 3 completed shifts:  In: 1100 [P.O.:1100]  Out: 2375 [Urine:2375]  No intake/output data recorded.  Vital signs were reviewed and documented in the chart      EXAM   Body mass index is 29.73 kg/m².      Patient appeared in good neurologic function with normal comprehension   CN grossly intact  Moves all extremities to command      DIAGNOSIS  Subdural hematoma status post MMA embolization      PLAN    Plan is to have patient follow-up with Dr. Jenkins in 1 week with a noncontrast CT of the head.    Patient will hold his Plavix for 7 days.    Hold aspirin until tomorrow.  Patient can restart that at home.

## 2024-10-27 NOTE — PROGRESS NOTES
LOS: 3 days   Patient Care Team:  Ariel Lee DO as PCP - General (Family Medicine)    Chief Complaint: arrhythmia    Subjective     Interval History:     No issues overnight.  No chest pain or shortness of breath.  Rare ectopy on monitor with sinus bradycardia noted  Review of Systems:    12 point review of systems reviewed pertinent for those mentioned in HPI    Objective     Vital Signs  Temp:  [97.7 °F (36.5 °C)-98.8 °F (37.1 °C)] 97.7 °F (36.5 °C)  Heart Rate:  [41-59] 49  Resp:  [14-18] 16  BP: (110-175)/() 143/96  Body mass index is 29.73 kg/m².    Intake/Output Summary (Last 24 hours) at 10/27/2024 0734  Last data filed at 10/27/2024 0400  Gross per 24 hour   Intake 500 ml   Output 2025 ml   Net -1525 ml     No intake/output data recorded.    Physical Exam:     General Appearance:  Alert, cooperative, in no acute distress   Head:  Normocephalic, without obvious abnormality, atraumatic   Eyes:  Lids and lashes normal, conjunctivae and sclerae normal, no icterus, no pallor, corneas clear, PERRLA   Ears:  Ears appear intact with no abnormalities noted   Throat:  No oral lesions, no thrush, oral mucosa moist   Neck:  No adenopathy, supple, trachea midline, no thyromegaly, no carotid bruit, no JVD   Back:  No kyphosis present, no scoliosis present, no skin lesions, erythema or scars, no tenderness to percussion or palpation, range of motion normal   Lungs:  Clear to auscultation, respirations regular, even and unlabored    Heart:  Regular rhythm and normal rate, normal S1 and S2, no murmur, no gallop, no rub, no click   Chest Wall:  No abnormalities observed   Abdomen:  Normal bowel sounds, no masses, no organomegaly, soft non-tender, non-distended, no guarding, no rebound tenderness   Rectal:  Deferred   Extremities:  Moves all extremities well, no edema, no cyanosis, no redness   Pulses:  Pulses palpable and equal bilaterally   Skin:  No bleeding, bruising or rash   Lymph nodes:  No palpable  "adenopathy   Neurologic:  Cranial nerves 2 - 12 grossly intact, sensation intact, DTR present and equal bilaterally                                                                                Results Review:     I reviewed the patient's new clinical results.  I reviewed the patient's new imaging results and agree with the interpretation.  I reviewed the patient's other test results and agree with the interpretation  I personally viewed and interpreted the patient's EKG/Telemetry data      WBC WBC   Date Value Ref Range Status   10/27/2024 17.48 (H) 3.40 - 10.80 10*3/mm3 Final   10/26/2024 11.40 (H) 3.40 - 10.80 10*3/mm3 Final   10/25/2024 10.03 3.40 - 10.80 10*3/mm3 Final   10/24/2024 12.95 (H) 3.40 - 10.80 10*3/mm3 Final      HGB Hemoglobin   Date Value Ref Range Status   10/27/2024 13.8 13.0 - 17.7 g/dL Final   10/26/2024 15.1 13.0 - 17.7 g/dL Final   10/25/2024 13.7 13.0 - 17.7 g/dL Final   10/24/2024 14.6 13.0 - 17.7 g/dL Final      HCT Hematocrit   Date Value Ref Range Status   10/27/2024 39.7 37.5 - 51.0 % Final   10/26/2024 43.8 37.5 - 51.0 % Final   10/25/2024 40.4 37.5 - 51.0 % Final   10/24/2024 42.6 37.5 - 51.0 % Final      Platlets No results found for: \"LABPLAT\"     PT/INR:    Protime   Date Value Ref Range Status   10/24/2024 13.9 12.2 - 14.5 Seconds Final   /  INR   Date Value Ref Range Status   10/24/2024 1.06 0.89 - 1.12 Final       Sodium Sodium   Date Value Ref Range Status   10/27/2024 138 136 - 145 mmol/L Final   10/26/2024 133 (L) 136 - 145 mmol/L Final   10/25/2024 138 136 - 145 mmol/L Final   10/24/2024 141 136 - 145 mmol/L Final      Potassium Potassium   Date Value Ref Range Status   10/27/2024 4.4 3.5 - 5.2 mmol/L Final     Comment:     Slight hemolysis detected by analyzer. Result may be falsely elevated.   10/26/2024 4.3 3.5 - 5.2 mmol/L Final     Comment:     Slight hemolysis detected by analyzer. Result may be falsely elevated.   10/25/2024 4.0 3.5 - 5.2 mmol/L Final     Comment:    " " Slight hemolysis detected by analyzer. Result may be falsely elevated.   10/24/2024 5.1 3.5 - 5.2 mmol/L Final      Chloride Chloride   Date Value Ref Range Status   10/27/2024 104 98 - 107 mmol/L Final   10/26/2024 100 98 - 107 mmol/L Final   10/25/2024 105 98 - 107 mmol/L Final   10/24/2024 104 98 - 107 mmol/L Final      Bicarbonate No results found for: \"PLASMABICARB\"   BUN BUN   Date Value Ref Range Status   10/27/2024 22 8 - 23 mg/dL Final   10/26/2024 21 8 - 23 mg/dL Final   10/25/2024 23 8 - 23 mg/dL Final   10/24/2024 23 8 - 23 mg/dL Final      Creatinine Creatinine   Date Value Ref Range Status   10/27/2024 1.10 0.76 - 1.27 mg/dL Final   10/26/2024 1.09 0.76 - 1.27 mg/dL Final   10/25/2024 1.07 0.76 - 1.27 mg/dL Final   10/24/2024 1.14 0.76 - 1.27 mg/dL Final      Calcium Calcium   Date Value Ref Range Status   10/27/2024 9.9 8.6 - 10.5 mg/dL Final   10/26/2024 9.8 8.6 - 10.5 mg/dL Final   10/25/2024 9.2 8.6 - 10.5 mg/dL Final   10/24/2024 10.4 8.6 - 10.5 mg/dL Final      Magnesium Magnesium   Date Value Ref Range Status   10/26/2024 2.3 1.6 - 2.4 mg/dL Final   10/25/2024 2.6 (H) 1.6 - 2.4 mg/dL Final            pH No results found for: \"PHART\"   pO2 No results found for: \"PO2ART\"   pCO2 No results found for: \"PEB5ZRT\"   HCO3 No results found for: \"PQU9FFT\"     amLODIPine, 10 mg, Oral, Daily  atorvastatin, 80 mg, Oral, Daily  insulin regular, 2-7 Units, Subcutaneous, TID With Meals  isosorbide mononitrate, 30 mg, Oral, Daily  levETIRAcetam, 750 mg, Intravenous, BID  lisinopril, 5 mg, Oral, Daily  sodium chloride, 10 mL, Intravenous, Q12H           Medication Review: Reviewed    Assessment & Plan     Patient Active Problem List   Diagnosis Code    Type 2 diabetes mellitus, without long-term current use of insulin E11.9    HLD (hyperlipidemia) E78.5    HTN (hypertension) I10    Coronary atherosclerosis due to lipid rich plaque I25.83    Generalized weakness R53.1    Subdural hematoma, nontraumatic I62.00    " Subarachnoid hemorrhage I60.9       Bradycardia/PVCs  Subdural hematoma  Coronary artery disease  Hypertension  Hyperlipidemia    Plan for disposition: Restart aspirin when able as outlined by neurosurgery.  Otherwise patient can follow with his primary cardiologist post discharge in 6 to 8 weeks.    Sharan Headley MD  10/27/24  07:34 EDT

## 2024-10-29 ENCOUNTER — READMISSION MANAGEMENT (OUTPATIENT)
Dept: CALL CENTER | Facility: HOSPITAL | Age: 69
End: 2024-10-29
Payer: MEDICARE

## 2024-10-29 NOTE — OUTREACH NOTE
Medical Week 1 Survey      Flowsheet Row Responses   [facility] patient discharged from? [facility]   Does the patient have one of the following disease processes/diagnoses(primary or secondary)? Other   Week 1 attempt successful? Yes   Call start time 0948   Call end time 1000   Discharge diagnosis Subdural hematoma, nontraumatic, MMA Emoblization   Meds reviewed with patient/caregiver? Yes   Is the patient having any side effects they believe may be caused by any medication additions or changes? No   Does the patient have all medications ordered at discharge? Yes   Is the patient taking all medications as directed (includes completed medication regime)? Yes   Does the patient have a primary care provider?  Yes   Does the patient have an appointment with their PCP within 7 days of discharge? Yes   Has the patient kept scheduled appointments due by today? N/A   Psychosocial issues? No   Did the patient receive a copy of their discharge instructions? Yes   Nursing interventions Reviewed instructions with patient   What is the patient's perception of their health status since discharge? Same   Is the patient/caregiver able to teach back signs and symptoms related to disease process for when to call PCP? Yes   Is the patient/caregiver able to teach back signs and symptoms related to disease process for when to call 911? Yes   Is the patient/caregiver able to teach back the hierarchy of who to call/visit for symptoms/problems? PCP, Specialist, Home health nurse, Urgent Care, ED, 911 Yes   If the patient is a current smoker, are they able to teach back resources for cessation? Not a smoker   Week 1 call completed? Yes   Would this patient benefit from a Referral to Amb Social Work? No   Is the patient interested in additional calls from an ambulatory ? No   Wrap up additional comments Pt shares he continues to have a HA, and bilateral leg pain,  pt denies swelling, redness in legs, or worsened leg pain  upon standing/walking. Pt advised to report bilateral leg pain to Neurosurgeon, and to make an appt. Pt advised to make cardiology/PCP fu appts   Call end time 1000            Felicitas Mariscal Registered

## 2024-10-30 DIAGNOSIS — I62.00 SUBDURAL HEMATOMA, NONTRAUMATIC: Primary | ICD-10-CM

## 2024-11-04 ENCOUNTER — HOSPITAL ENCOUNTER (EMERGENCY)
Facility: HOSPITAL | Age: 69
Discharge: ANOTHER HOSPITAL | DRG: 025 | End: 2024-11-04
Attending: STUDENT IN AN ORGANIZED HEALTH CARE EDUCATION/TRAINING PROGRAM | Admitting: STUDENT IN AN ORGANIZED HEALTH CARE EDUCATION/TRAINING PROGRAM
Payer: MEDICARE

## 2024-11-04 ENCOUNTER — PREP FOR SURGERY (OUTPATIENT)
Dept: OTHER | Facility: HOSPITAL | Age: 69
End: 2024-11-04
Payer: MEDICARE

## 2024-11-04 ENCOUNTER — HOSPITAL ENCOUNTER (INPATIENT)
Facility: HOSPITAL | Age: 69
LOS: 4 days | Discharge: HOME | DRG: 025 | End: 2024-11-08
Attending: FAMILY MEDICINE | Admitting: INTERNAL MEDICINE
Payer: MEDICARE

## 2024-11-04 ENCOUNTER — HOSPITAL ENCOUNTER (OUTPATIENT)
Dept: CT IMAGING | Facility: HOSPITAL | Age: 69
Discharge: HOME | End: 2024-11-04
Payer: MEDICARE

## 2024-11-04 ENCOUNTER — READMISSION MANAGEMENT (OUTPATIENT)
Dept: CALL CENTER | Facility: HOSPITAL | Age: 69
End: 2024-11-04
Payer: MEDICARE

## 2024-11-04 ENCOUNTER — TELEPHONE (OUTPATIENT)
Dept: NEUROSURGERY | Facility: CLINIC | Age: 69
End: 2024-11-04
Payer: MEDICARE

## 2024-11-04 VITALS
BODY MASS INDEX: 30.94 KG/M2 | HEIGHT: 71 IN | RESPIRATION RATE: 16 BRPM | SYSTOLIC BLOOD PRESSURE: 143 MMHG | OXYGEN SATURATION: 99 % | WEIGHT: 221 LBS | DIASTOLIC BLOOD PRESSURE: 78 MMHG | TEMPERATURE: 97.8 F | HEART RATE: 54 BPM

## 2024-11-04 DIAGNOSIS — S06.5XAA SDH (SUBDURAL HEMATOMA): Primary | ICD-10-CM

## 2024-11-04 DIAGNOSIS — I62.00 SUBDURAL HEMATOMA, NONTRAUMATIC: ICD-10-CM

## 2024-11-04 DIAGNOSIS — R41.841 COGNITIVE COMMUNICATION DISORDER: ICD-10-CM

## 2024-11-04 DIAGNOSIS — R53.1 GENERALIZED WEAKNESS: ICD-10-CM

## 2024-11-04 DIAGNOSIS — S06.5XAA SUBDURAL HEMATOMA: Primary | ICD-10-CM

## 2024-11-04 DIAGNOSIS — I62.00 SUBDURAL HEMATOMA, NONTRAUMATIC: Primary | ICD-10-CM

## 2024-11-04 PROBLEM — R51.9 HEADACHE: Status: ACTIVE | Noted: 2024-11-04

## 2024-11-04 LAB
ALBUMIN SERPL-MCNC: 3.8 G/DL (ref 3.5–5.2)
ALBUMIN/GLOB SERPL: 1.3 G/DL
ALP SERPL-CCNC: 88 U/L (ref 39–117)
ALT SERPL W P-5'-P-CCNC: 25 U/L (ref 1–41)
ANION GAP SERPL CALCULATED.3IONS-SCNC: 9.9 MMOL/L (ref 5–15)
APTT PPP: 27.6 SECONDS (ref 26.5–34.5)
AST SERPL-CCNC: 25 U/L (ref 1–40)
BASOPHILS # BLD AUTO: 0.03 10*3/MM3 (ref 0–0.2)
BASOPHILS NFR BLD AUTO: 0.2 % (ref 0–1.5)
BILIRUB SERPL-MCNC: 0.6 MG/DL (ref 0–1.2)
BUN SERPL-MCNC: 20 MG/DL (ref 8–23)
BUN/CREAT SERPL: 18.7 (ref 7–25)
CALCIUM SPEC-SCNC: 9.7 MG/DL (ref 8.6–10.5)
CHLORIDE SERPL-SCNC: 103 MMOL/L (ref 98–107)
CO2 SERPL-SCNC: 24.1 MMOL/L (ref 22–29)
CREAT SERPL-MCNC: 1.07 MG/DL (ref 0.76–1.27)
DEPRECATED RDW RBC AUTO: 43.4 FL (ref 37–54)
EGFRCR SERPLBLD CKD-EPI 2021: 75.1 ML/MIN/1.73
EOSINOPHIL # BLD AUTO: 0.02 10*3/MM3 (ref 0–0.4)
EOSINOPHIL NFR BLD AUTO: 0.1 % (ref 0.3–6.2)
ERYTHROCYTE [DISTWIDTH] IN BLOOD BY AUTOMATED COUNT: 13.7 % (ref 12.3–15.4)
GLOBULIN UR ELPH-MCNC: 2.9 GM/DL
GLUCOSE SERPL-MCNC: 166 MG/DL (ref 65–99)
HCT VFR BLD AUTO: 43.4 % (ref 37.5–51)
HGB BLD-MCNC: 14.8 G/DL (ref 13–17.7)
HOLD SPECIMEN: NORMAL
HOLD SPECIMEN: NORMAL
IMM GRANULOCYTES # BLD AUTO: 0.09 10*3/MM3 (ref 0–0.05)
IMM GRANULOCYTES NFR BLD AUTO: 0.6 % (ref 0–0.5)
INR PPP: 1.03 (ref 0.9–1.1)
LYMPHOCYTES # BLD AUTO: 1.15 10*3/MM3 (ref 0.7–3.1)
LYMPHOCYTES NFR BLD AUTO: 7.8 % (ref 19.6–45.3)
MCH RBC QN AUTO: 29.6 PG (ref 26.6–33)
MCHC RBC AUTO-ENTMCNC: 34.1 G/DL (ref 31.5–35.7)
MCV RBC AUTO: 86.8 FL (ref 79–97)
MONOCYTES # BLD AUTO: 0.92 10*3/MM3 (ref 0.1–0.9)
MONOCYTES NFR BLD AUTO: 6.3 % (ref 5–12)
NEUTROPHILS NFR BLD AUTO: 12.5 10*3/MM3 (ref 1.7–7)
NEUTROPHILS NFR BLD AUTO: 85 % (ref 42.7–76)
NRBC BLD AUTO-RTO: 0 /100 WBC (ref 0–0.2)
PLATELET # BLD AUTO: 329 10*3/MM3 (ref 140–450)
PMV BLD AUTO: 10.7 FL (ref 6–12)
POTASSIUM SERPL-SCNC: 4.2 MMOL/L (ref 3.5–5.2)
PROT SERPL-MCNC: 6.7 G/DL (ref 6–8.5)
PROTHROMBIN TIME: 13.6 SECONDS (ref 12.1–14.7)
RBC # BLD AUTO: 5 10*6/MM3 (ref 4.14–5.8)
SODIUM SERPL-SCNC: 137 MMOL/L (ref 136–145)
WBC NRBC COR # BLD AUTO: 14.71 10*3/MM3 (ref 3.4–10.8)
WHOLE BLOOD HOLD COAG: NORMAL
WHOLE BLOOD HOLD SPECIMEN: NORMAL

## 2024-11-04 PROCEDURE — 82948 REAGENT STRIP/BLOOD GLUCOSE: CPT

## 2024-11-04 PROCEDURE — 25010000002 DEXAMETHASONE PER 1 MG: Performed by: PHYSICIAN ASSISTANT

## 2024-11-04 PROCEDURE — 80053 COMPREHEN METABOLIC PANEL: CPT | Performed by: PHYSICIAN ASSISTANT

## 2024-11-04 PROCEDURE — 63710000001 INSULIN LISPRO (HUMAN) PER 5 UNITS: Performed by: NURSE PRACTITIONER

## 2024-11-04 PROCEDURE — 85730 THROMBOPLASTIN TIME PARTIAL: CPT | Performed by: PHYSICIAN ASSISTANT

## 2024-11-04 PROCEDURE — 99222 1ST HOSP IP/OBS MODERATE 55: CPT | Performed by: NURSE PRACTITIONER

## 2024-11-04 PROCEDURE — 96374 THER/PROPH/DIAG INJ IV PUSH: CPT

## 2024-11-04 PROCEDURE — 70450 CT HEAD/BRAIN W/O DYE: CPT

## 2024-11-04 PROCEDURE — 99285 EMERGENCY DEPT VISIT HI MDM: CPT

## 2024-11-04 PROCEDURE — 70450 CT HEAD/BRAIN W/O DYE: CPT | Performed by: RADIOLOGY

## 2024-11-04 PROCEDURE — 85025 COMPLETE CBC W/AUTO DIFF WBC: CPT | Performed by: PHYSICIAN ASSISTANT

## 2024-11-04 PROCEDURE — 85610 PROTHROMBIN TIME: CPT | Performed by: PHYSICIAN ASSISTANT

## 2024-11-04 RX ORDER — NICOTINE POLACRILEX 4 MG
15 LOZENGE BUCCAL
Status: DISCONTINUED | OUTPATIENT
Start: 2024-11-04 | End: 2024-11-08 | Stop reason: HOSPADM

## 2024-11-04 RX ORDER — AMOXICILLIN 250 MG
2 CAPSULE ORAL 2 TIMES DAILY PRN
Status: DISCONTINUED | OUTPATIENT
Start: 2024-11-04 | End: 2024-11-08 | Stop reason: HOSPADM

## 2024-11-04 RX ORDER — BISACODYL 10 MG
10 SUPPOSITORY, RECTAL RECTAL DAILY PRN
Status: DISCONTINUED | OUTPATIENT
Start: 2024-11-04 | End: 2024-11-08 | Stop reason: HOSPADM

## 2024-11-04 RX ORDER — DEXTROSE MONOHYDRATE 25 G/50ML
25 INJECTION, SOLUTION INTRAVENOUS
Status: DISCONTINUED | OUTPATIENT
Start: 2024-11-04 | End: 2024-11-08 | Stop reason: HOSPADM

## 2024-11-04 RX ORDER — SODIUM CHLORIDE 0.9 % (FLUSH) 0.9 %
10 SYRINGE (ML) INJECTION EVERY 12 HOURS SCHEDULED
Status: DISCONTINUED | OUTPATIENT
Start: 2024-11-04 | End: 2024-11-05

## 2024-11-04 RX ORDER — DEXAMETHASONE SODIUM PHOSPHATE 10 MG/ML
10 INJECTION, SOLUTION INTRAMUSCULAR; INTRAVENOUS
Status: CANCELLED | OUTPATIENT
Start: 2024-11-04

## 2024-11-04 RX ORDER — FAMOTIDINE 20 MG/1
20 TABLET, FILM COATED ORAL
Status: CANCELLED | OUTPATIENT
Start: 2024-11-04

## 2024-11-04 RX ORDER — POLYETHYLENE GLYCOL 3350 17 G/17G
17 POWDER, FOR SOLUTION ORAL DAILY PRN
Status: DISCONTINUED | OUTPATIENT
Start: 2024-11-04 | End: 2024-11-08 | Stop reason: HOSPADM

## 2024-11-04 RX ORDER — SODIUM CHLORIDE 9 MG/ML
50 INJECTION, SOLUTION INTRAVENOUS CONTINUOUS
Status: CANCELLED | OUTPATIENT
Start: 2024-11-04 | End: 2024-11-06

## 2024-11-04 RX ORDER — SODIUM CHLORIDE 0.9 % (FLUSH) 0.9 %
10 SYRINGE (ML) INJECTION AS NEEDED
Status: DISCONTINUED | OUTPATIENT
Start: 2024-11-04 | End: 2024-11-08 | Stop reason: HOSPADM

## 2024-11-04 RX ORDER — INSULIN LISPRO 100 [IU]/ML
2-7 INJECTION, SOLUTION INTRAVENOUS; SUBCUTANEOUS
Status: DISCONTINUED | OUTPATIENT
Start: 2024-11-04 | End: 2024-11-08 | Stop reason: HOSPADM

## 2024-11-04 RX ORDER — SODIUM CHLORIDE 0.9 % (FLUSH) 0.9 %
3-10 SYRINGE (ML) INJECTION AS NEEDED
Status: CANCELLED | OUTPATIENT
Start: 2024-11-04

## 2024-11-04 RX ORDER — SODIUM CHLORIDE 0.9 % (FLUSH) 0.9 %
3 SYRINGE (ML) INJECTION EVERY 12 HOURS SCHEDULED
Status: CANCELLED | OUTPATIENT
Start: 2024-11-04

## 2024-11-04 RX ORDER — ACETAMINOPHEN 650 MG/1
650 SUPPOSITORY RECTAL EVERY 4 HOURS PRN
Status: DISCONTINUED | OUTPATIENT
Start: 2024-11-04 | End: 2024-11-08 | Stop reason: HOSPADM

## 2024-11-04 RX ORDER — ACETAMINOPHEN 160 MG/5ML
650 SOLUTION ORAL EVERY 4 HOURS PRN
Status: DISCONTINUED | OUTPATIENT
Start: 2024-11-04 | End: 2024-11-08 | Stop reason: HOSPADM

## 2024-11-04 RX ORDER — ACETAMINOPHEN 325 MG/1
650 TABLET ORAL EVERY 6 HOURS PRN
Status: DISCONTINUED | OUTPATIENT
Start: 2024-11-04 | End: 2024-11-08 | Stop reason: HOSPADM

## 2024-11-04 RX ORDER — DEXAMETHASONE SODIUM PHOSPHATE 4 MG/ML
4 INJECTION, SOLUTION INTRA-ARTICULAR; INTRALESIONAL; INTRAMUSCULAR; INTRAVENOUS; SOFT TISSUE ONCE
Status: COMPLETED | OUTPATIENT
Start: 2024-11-04 | End: 2024-11-04

## 2024-11-04 RX ORDER — SODIUM CHLORIDE 9 MG/ML
40 INJECTION, SOLUTION INTRAVENOUS AS NEEDED
Status: DISCONTINUED | OUTPATIENT
Start: 2024-11-04 | End: 2024-11-05

## 2024-11-04 RX ORDER — SODIUM CHLORIDE 9 MG/ML
40 INJECTION, SOLUTION INTRAVENOUS AS NEEDED
Status: CANCELLED | OUTPATIENT
Start: 2024-11-04

## 2024-11-04 RX ORDER — CHLORHEXIDINE GLUCONATE 40 MG/ML
SOLUTION TOPICAL
Qty: 120 ML | Refills: 0 | Status: SHIPPED | OUTPATIENT
Start: 2024-11-04 | End: 2024-11-08 | Stop reason: HOSPADM

## 2024-11-04 RX ORDER — BISACODYL 5 MG/1
5 TABLET, DELAYED RELEASE ORAL DAILY PRN
Status: DISCONTINUED | OUTPATIENT
Start: 2024-11-04 | End: 2024-11-08 | Stop reason: HOSPADM

## 2024-11-04 RX ORDER — ACETAMINOPHEN 325 MG/1
650 TABLET ORAL EVERY 4 HOURS PRN
Status: DISCONTINUED | OUTPATIENT
Start: 2024-11-04 | End: 2024-11-08 | Stop reason: HOSPADM

## 2024-11-04 RX ORDER — IBUPROFEN 600 MG/1
1 TABLET ORAL
Status: DISCONTINUED | OUTPATIENT
Start: 2024-11-04 | End: 2024-11-08 | Stop reason: HOSPADM

## 2024-11-04 RX ADMIN — DEXAMETHASONE SODIUM PHOSPHATE 4 MG: 4 INJECTION, SOLUTION INTRA-ARTICULAR; INTRALESIONAL; INTRAMUSCULAR; INTRAVENOUS; SOFT TISSUE at 14:06

## 2024-11-04 RX ADMIN — Medication 10 ML: at 22:23

## 2024-11-04 RX ADMIN — INSULIN LISPRO 2 UNITS: 100 INJECTION, SOLUTION INTRAVENOUS; SUBCUTANEOUS at 20:49

## 2024-11-04 RX ADMIN — Medication 10 ML: at 20:51

## 2024-11-04 RX ADMIN — ACETAMINOPHEN 650 MG: 325 TABLET ORAL at 20:50

## 2024-11-04 NOTE — TELEPHONE ENCOUNTER
Provider:  Gregory  Surgery/Procedure:  MCA artery embolization  Surgery/Procedure Date:  10/24/24  Last visit:   NA  Next visit: 11/13/24     Reason for call:  Patient spouse called, patient complains of extreme headache, a lot of pressure at the top of his head. He is taking extra strength Tylenol without any relief, she states he isn't able to take ibuprofen. Denies any change to vision or speech, no dizziness, no numbness or tingling and no weakness. His spouse states other than the headache he has no complaints.

## 2024-11-04 NOTE — ED NOTES
[facility] accepted. Placed on [facility]. Waiting on room assignment from RAHUL Fregoso.

## 2024-11-04 NOTE — H&P
[facility] Medicine Services  HISTORY AND PHYSICAL    Patient Name: Manjit Thomas  : 1955  MRN: [id number]  Primary Care Physician: Ariel Lee DO  Date of admission: 2024    Subjective   Subjective     Chief Complaint:  Headaches/ SDH     HPI:  Manjit Thomas is a 69 y.o. male with past medical history significant for DM type II, HTN, HLD, CAD, and recent SDH.  Patient was admitted at [facility] 10/23 and underwent MMA embolization 10/24 with Dr. Jenkins.  Developed bradycardia followed by cardiology and seizure-like activity followed by neurology started on Keppra.  Discharged home 10/27/2024.  Has been off his aspirin and Plavix since 10/23/2024.    Patient presents to PCP today with increasing headaches x 1 week with associated decreased appetite and nausea.  No focal deficits.  Sent to [facility] ER for evaluation.  CT of the head showed acute on chronic subdural hematoma with a 1 cm left shift.  She was transferred to [facility] with Dr. Jenkins with neurosurgery aware and agreed to follow.  Plans for gavin hole placement later this week.  On exam patient awake and alert.  Oriented x 4.  Complaining of headache center frontal head rated 5/10 scale.  No visual deficits.  NIH 0.  Will admit to hospital medicine service and consult to neurosurgery to follow.        Review of Systems   Constitutional:  Positive for activity change, appetite change and fatigue. Negative for chills, diaphoresis, fever and unexpected weight change.   Eyes:  Negative for photophobia and visual disturbance.   Respiratory: Negative.     Cardiovascular: Negative.    Gastrointestinal:  Positive for nausea. Negative for abdominal pain, blood in stool, constipation, diarrhea and vomiting.   Endocrine: Negative.    Genitourinary: Negative.    Musculoskeletal: Negative.    Skin: Negative.    Allergic/Immunologic: Negative.    Neurological:  Positive for seizures and headaches.  Negative for dizziness.        Poss sz like activity on prior admit here.  Started on keppra    Hematological: Negative.    Psychiatric/Behavioral: Negative.            Personal History     Past Medical History:   Diagnosis Date    Coronary atherosclerosis     Diabetes mellitus     Headache     HLD (hyperlipidemia)     Hypertension     Vitamin B12 deficiency          Past Surgical History:   Procedure Laterality Date    CORONARY ANGIOPLASTY WITH STENT PLACEMENT N/A     EMBOLIZATION CEREBRAL N/A 10/24/2024    Procedure: MMA Emoblization;  Surgeon: Manjit Jenkins MD;  Location: [facility] CATH INVASIVE LOCATION;  Service: Interventional Radiology;  Laterality: N/A;       Family History:  family history includes CABG in his brother; Coronary artery disease in his brother; Heart attack in his father.     Social History:  reports that he has never smoked. He has never used smokeless tobacco. He reports that he does not drink alcohol and does not use drugs.  Social History     Social History Narrative    . Lives with wife. Independent at .        Medications:  Chlorhexidine Gluconate, amLODIPine, aspirin, atorvastatin, dapagliflozin Propanediol, fluticasone, folic acid, isosorbide mononitrate, levETIRAcetam, lisinopril, mupirocin, and vitamin B-12    Allergies   Allergen Reactions    Benzodiazepines     Codeine Nausea Only     Other Reaction(s) from [facility] System: Nausea    Phenobarbital Itching       Objective   Objective     Vital Signs:   Temp:  [97.8 °F (36.6 °C)-98.9 °F (37.2 °C)] 98.9 °F (37.2 °C)  Heart Rate:  [50-61] 57  Resp:  [16-18] 16  BP: (111-153)/(65-82) 145/82    Physical Exam   Constitutional: Awake, alert.  Resting back in bed.  Keeps eyes closed mostly due to complaint of headache.  No visitors at bedside.  Eyes: PERRLA, sclerae anicteric, no conjunctival injection  HENT: NCAT, mucous membranes moist  Neck: Supple, no thyromegaly, no lymphadenopathy, trachea midline  Respiratory: Clear  to auscultation bilaterally, nonlabored respirations on room air.  Sats WNL.  Cardiovascular: RRR mild sinus bradycardia, no murmurs, rubs, or gallops, palpable pedal pulses bilaterally  Gastrointestinal: Positive bowel sounds, soft, nontender, nondistended.  Musculoskeletal: No bilateral ankle edema, no clubbing or cyanosis to extremities.  [place] spontaneously.  Psychiatric: Appropriate affect, cooperative  Neurologic: Oriented x 3, strength symmetric in all extremities, Cranial Nerves grossly intact to confrontation, speech clear.  Follows commands.  Skin: No rashes      Result Review:  I have personally reviewed the results from the time of this admission to 11/4/2024 18:59 EST and agree with these findings:  [x]  Laboratory list / accordion  []  Microbiology  [x]  Radiology  []  EKG/Telemetry   []  Cardiology/Vascular   []  Pathology  [x]  Old records  []  Other:  Most notable findings include:     LAB RESULTS:      Lab 11/04/24  1348   WBC 14.71*   HEMOGLOBIN 14.8   HEMATOCRIT 43.4   PLATELETS 329   NEUTROS ABS 12.50*   IMMATURE GRANS (ABS) 0.09*   LYMPHS ABS 1.15   MONOS ABS 0.92*   EOS ABS 0.02   MCV 86.8   PROTIME 13.6   APTT 27.6         Lab 11/04/24  1348   SODIUM 137   POTASSIUM 4.2   CHLORIDE 103   CO2 24.1   ANION GAP 9.9   BUN 20   CREATININE 1.07   EGFR 75.1   GLUCOSE 166*   CALCIUM 9.7         Lab 11/04/24  1348   TOTAL PROTEIN 6.7   ALBUMIN 3.8   GLOBULIN 2.9   ALT (SGPT) 25   AST (SGOT) 25   BILIRUBIN 0.6   ALK PHOS 88         Lab 11/04/24  1348   PROTIME 13.6   INR 1.03                 Brief Urine Lab Results       None          Microbiology Results (last 10 days)       ** No results found for the last 240 hours. **            CT Head Without Contrast    Result Date: 11/4/2024  PROCEDURE: CT HEAD WO CONTRAST-  HISTORY: s/p MMA emobolization; I62.00-Nontraumatic subdural hemorrhage, unspecified  COMPARISON: 10/27/2024.  TECHNIQUE: Multiple axial CT images were performed from the foramen magnum to the  vertex without enhancement. This study was performed with techniques to keep radiation doses as low as reasonably achievable (ALARA). Individualized dose reduction techniques using automated exposure control or adjustment of mA and/or kV according to the patient size were employed.  FINDINGS: There is an acute on chronic right subdural hematoma overlying the right frontal and parietal lobes which has a maximum thickness of 1.6 cm. There is mass effect upon the underlying right cerebral hemisphere with 10 mm of leftward midline shift. The suprasellar cistern is patent. There is no hydrocephalus. There is a small chronic left subdural hematoma. The paranasal sinuses are clear. Bone windows reveal no acute osseous abnormalities.      Impression: Acute on chronic right subdural hematoma with 1 cm of leftward midline shift. The patient was instructed to go to the emergency department for further evaluation as this was an outpatient exam.     This report was finalized on 11/4/2024 1:04 PM by Lynette López M.D..           Assessment & Plan   Assessment & Plan       SDH (subdural hematoma)    Type 2 diabetes mellitus, without long-term current use of insulin    HTN (hypertension)    Coronary atherosclerosis due to lipid rich plaque    Generalized weakness    Subdural hematoma, nontraumatic    Headache    Manjit Thomas is a 69 y.o. male with past medical history significant for DM type II, HTN, HLD, CAD, and recent SDH.  Patient was admitted at [facility] 10/23 and underwent MMA embolization 10/24 with Dr. Jenkins.  Developed bradycardia followed by cardiology and seizure-like activity followed by neurology started on Keppra.  Discharged home 10/27/2024.  Has been off his aspirin and Plavix since 10/23/2024.    Patient presents to PCP today with increasing headaches x 1 week with associated decreased appetite and nausea.  No focal deficits.  Sent to [facility] ER for evaluation.  CT of the head showed acute  "on chronic subdural hematoma with a 1 cm left shift.  She was transferred to [facility] with Dr. Jenkins with neurosurgery aware and agreed to follow.  Plans for gavin hole placement later this week.  On exam patient awake and alert.  Oriented x 4.  Complaining of headache center frontal head rated 5/10 scale.  No visual deficits.  NIH 0.  Will admit to hospital medicine service and consult to neurosurgery to follow.    Assessment/plan:    Acute/chronic SDH  Worsening frontal headache  Seizure-like activity (noted on recent admit started on Keppra)  --Follow-up from [facility] today.  Accepted by Dr. Jenkins NS.  -- Recent MCA embolization 10/24 here with Dr. Jenkins  -- Resistant headaches.  Outside CT head today showed acute/chronic right SDH with 1 cm of left shift.  NS aware.  -- NIH= 0 on presentation  -- Consult neurosurgery  -- Recent admit 10/23 - 10/27 with new \"seizure-like activity started on Keppra followed by neurology.  Consider consult.  -- Plan for gavin holes later this week with Dr. Jenkins.  On schedule questionable 11/6.    HTN/HLD/CAD  Recent bradycardia (prior admission)  --Followed by cardiology last admit  -- Home meds as tolerated.  -- Holding any antiplatelet/anticoagulant    DM type II (A1c 5.7)  -- Hold any home oral agents  -- Jhoanu-SASHA Nur for now    Obesity: BMI 30.82      DVT prophylaxis:  sequentials, (no A/C due to SHD)    CODE STATUS:    Level Of Support Discussed With: Patient  Code Status (Patient has no pulse and is not breathing): CPR (Attempt to Resuscitate)  Medical Interventions (Patient has pulse or is breathing): Full Support      Expected DischargeTBD   Expected discharge date/ time has not been documented.      This note has been completed as part of a split-shared workflow.     Signature: Electronically signed by JOSÉ MIGUEL Cerna, 11/04/24, 6:59 PM EST  Patient seen and examined. Alert and chatty watching the financial channel.  Agree with " above.  Chiara Blank MD 11/04/24 23:04 EST

## 2024-11-04 NOTE — ED NOTES
[facility] called back with room assignment. Called [employer] at this time. [place] [id number] has a 23 minute ETA

## 2024-11-04 NOTE — ED NOTES
Wally called and said 109 had to take another flight and that they are sending 33 instead. Advised them we are still waiting on room assignment

## 2024-11-04 NOTE — ED PROVIDER NOTES
Subjective   History of Present Illness  69-year-old male presents secondary to abnormal CT.  Patient recently had a subdural hematoma.  Patient was seen at [facility].  Patient was discharged approximately 8 days ago.  He states that he had a headache at that time however this is worsened over the past few days.  CT showed evidence of acute on chronic subdural hematoma along with midline shift.  Patient denies any lateralizing weakness.  He has had poor appetite along with nausea.  He denies any falls or reinjuries.  He is not currently taking any type of antiplatelet or anticoagulant.  He has a past medical history of coronary artery disease, diabetes, hyperlipidemia, hypertension and vitamin D deficiency.  They presented by private vehicle.      Review of Systems   Constitutional: Negative.  Negative for fever.   HENT: Negative.     Respiratory: Negative.     Cardiovascular: Negative.  Negative for chest pain.   Gastrointestinal: Negative.  Negative for abdominal pain.   Endocrine: Negative.    Genitourinary: Negative.  Negative for dysuria.   Skin: Negative.    Neurological: Negative.    Psychiatric/Behavioral: Negative.     All other systems reviewed and are negative.      Past Medical History:   Diagnosis Date    Coronary atherosclerosis     Diabetes mellitus     Headache     HLD (hyperlipidemia)     Hypertension     Vitamin B12 deficiency        Allergies   Allergen Reactions    Benzodiazepines     Codeine Nausea Only     Other Reaction(s) from [facility] System: Nausea    Phenobarbital Itching       Past Surgical History:   Procedure Laterality Date    CORONARY ANGIOPLASTY WITH STENT PLACEMENT N/A     EMBOLIZATION CEREBRAL N/A 10/24/2024    Procedure: MMA Emoblization;  Surgeon: Manjit Jenkins MD;  Location: [facility] INVASIVE LOCATION;  Service: Interventional Radiology;  Laterality: N/A;       Family History   Problem Relation Age of Onset    Heart attack Father     Coronary artery  disease Brother     Other (CABG) Brother        Social History     Socioeconomic History    Marital status:    Tobacco Use    Smoking status: Never    Smokeless tobacco: Never   Vaping Use    Vaping status: Never Used   Substance and Sexual Activity    Alcohol use: Never    Drug use: Never    Sexual activity: Defer           Objective   Physical Exam  Vitals and nursing note reviewed.   Constitutional:       General: He is not in acute distress.     Appearance: He is well-developed. He is not diaphoretic.   HENT:      Head: Normocephalic and atraumatic.      Right Ear: External ear normal.      Left Ear: External ear normal.      Nose: Nose normal.   Eyes:      Conjunctiva/sclera: Conjunctivae normal.      Pupils: Pupils are equal, round, and reactive to light.   Neck:      Vascular: No JVD.      Trachea: No tracheal deviation.   Cardiovascular:      Rate and Rhythm: Normal rate and regular rhythm.      Heart sounds: Normal heart sounds. No murmur heard.  Pulmonary:      Effort: Pulmonary effort is normal. No respiratory distress.      Breath sounds: Normal breath sounds. No wheezing.   Abdominal:      General: Bowel sounds are normal.      Palpations: Abdomen is soft.      Tenderness: There is no abdominal tenderness.   Musculoskeletal:         General: No deformity. Normal range of motion.      Cervical back: Normal range of motion and neck supple.   Skin:     General: Skin is warm and dry.      Coloration: Skin is not pale.      Findings: No erythema or rash.   Neurological:      Mental Status: He is alert and oriented to person, place, and time.      Cranial Nerves: No cranial nerve deficit.   Psychiatric:         Behavior: Behavior normal.         Thought Content: Thought content normal.         Procedures           ED Course  ED Course as of 11/04/24 1806   Mon Nov 04, 2024   1340 Reviewed with Priya with Dr. Jenkins's group who will be discussing with the hospitalist group.  We will intend to fly  patient upon receiving a bed [JI]      ED Course User Index  [JI] Gonzalez Muniz PA                            Total (NIH Stroke Scale): 0        Results for orders placed or performed during the hospital encounter of 11/04/24   Comprehensive Metabolic Panel    Collection Time: 11/04/24  1:48 PM    Specimen: Blood   Result Value Ref Range    Glucose 166 (H) 65 - 99 mg/dL    BUN 20 8 - 23 mg/dL    Creatinine 1.07 0.76 - 1.27 mg/dL    Sodium 137 136 - 145 mmol/L    Potassium 4.2 3.5 - 5.2 mmol/L    Chloride 103 98 - 107 mmol/L    CO2 24.1 22.0 - 29.0 mmol/L    Calcium 9.7 8.6 - 10.5 mg/dL    Total Protein 6.7 6.0 - 8.5 g/dL    Albumin 3.8 3.5 - 5.2 g/dL    ALT (SGPT) 25 1 - 41 U/L    AST (SGOT) 25 1 - 40 U/L    Alkaline Phosphatase 88 39 - 117 U/L    Total Bilirubin 0.6 0.0 - 1.2 mg/dL    Globulin 2.9 gm/dL    A/G Ratio 1.3 g/dL    BUN/Creatinine Ratio 18.7 7.0 - 25.0    Anion Gap 9.9 5.0 - 15.0 mmol/L    eGFR 75.1 >60.0 mL/min/1.73   Protime-INR    Collection Time: 11/04/24  1:48 PM    Specimen: Blood   Result Value Ref Range    Protime 13.6 12.1 - 14.7 Seconds    INR 1.03 0.90 - 1.10   aPTT    Collection Time: 11/04/24  1:48 PM    Specimen: Blood   Result Value Ref Range    PTT 27.6 26.5 - 34.5 seconds   CBC Auto Differential    Collection Time: 11/04/24  1:48 PM    Specimen: Blood   Result Value Ref Range    WBC 14.71 (H) 3.40 - 10.80 10*3/mm3    RBC 5.00 4.14 - 5.80 10*6/mm3    Hemoglobin 14.8 13.0 - 17.7 g/dL    Hematocrit 43.4 37.5 - 51.0 %    MCV 86.8 79.0 - 97.0 fL    MCH 29.6 26.6 - 33.0 pg    MCHC 34.1 31.5 - 35.7 g/dL    RDW 13.7 12.3 - 15.4 %    RDW-SD 43.4 37.0 - 54.0 fl    MPV 10.7 6.0 - 12.0 fL    Platelets 329 140 - 450 10*3/mm3    Neutrophil % 85.0 (H) 42.7 - 76.0 %    Lymphocyte % 7.8 (L) 19.6 - 45.3 %    Monocyte % 6.3 5.0 - 12.0 %    Eosinophil % 0.1 (L) 0.3 - 6.2 %    Basophil % 0.2 0.0 - 1.5 %    Immature Grans % 0.6 (H) 0.0 - 0.5 %    Neutrophils, Absolute 12.50 (H) 1.70 - 7.00 10*3/mm3     Lymphocytes, Absolute 1.15 0.70 - 3.10 10*3/mm3    Monocytes, Absolute 0.92 (H) 0.10 - 0.90 10*3/mm3    Eosinophils, Absolute 0.02 0.00 - 0.40 10*3/mm3    Basophils, Absolute 0.03 0.00 - 0.20 10*3/mm3    Immature Grans, Absolute 0.09 (H) 0.00 - 0.05 10*3/mm3    nRBC 0.0 0.0 - 0.2 /100 WBC   Green Top (Gel)    Collection Time: 11/04/24  1:48 PM   Result Value Ref Range    Extra Tube Hold for add-ons.    Lavender Top    Collection Time: 11/04/24  1:48 PM   Result Value Ref Range    Extra Tube hold for add-on    Gold Top - SST    Collection Time: 11/04/24  1:48 PM   Result Value Ref Range    Extra Tube Hold for add-ons.    Light Blue Top    Collection Time: 11/04/24  1:48 PM   Result Value Ref Range    Extra Tube Hold for add-ons.      CT Head Without Contrast    Result Date: 11/4/2024  Acute on chronic right subdural hematoma with 1 cm of leftward midline shift. The patient was instructed to go to the emergency department for further evaluation as this was an outpatient exam.     This report was finalized on 11/4/2024 1:04 PM by Lynette López M.D..               Medical Decision Making  69-year-old male presents secondary to abnormal CT.  Patient recently had a subdural hematoma.  Patient was seen at [facility].  Patient was discharged approximately 8 days ago.  He states that he had a headache at that time however this is worsened over the past few days.  CT showed evidence of acute on chronic subdural hematoma along with midline shift.  Patient denies any lateralizing weakness.  He has had poor appetite along with nausea.  He denies any falls or reinjuries.  He is not currently taking any type of antiplatelet or anticoagulant.  He has a past medical history of coronary artery disease, diabetes, hyperlipidemia, hypertension and vitamin D deficiency.  They presented by private vehicle.    Problems Addressed:  Subdural hematoma: complicated acute illness or injury    Amount and/or Complexity of Data  Reviewed  Labs: ordered. Decision-making details documented in ED Course.  Discussion of management or test interpretation with external provider(s): Jennifer with Dr Jenkins. Dr Ramirez    Risk  Prescription drug management.        Final diagnoses:   Subdural hematoma       ED Disposition  ED Disposition       ED Disposition   Transfer to Another Facility     Condition   --    Comment   --               No follow-up provider specified.       Medication List        New Prescriptions      Chlorhexidine Gluconate 4 % solution  Shower each day with solution for 5 days beginning 5 days before surgery.     mupirocin 2 % nasal ointment  Commonly known as: BACTROBAN  Apply to the inside of each nostril with a cotton swab two times daily, morning and evening, for 5 days before surgery.               Where to Get Your Medications        These medications were sent to [id number] - [address] AT [facility] [address] [facility] - [phone]  - [phone] [facility] [address]      Phone: [phone]   Chlorhexidine Gluconate 4 % solution  mupirocin 2 % nasal ointment            Gonzalez Muniz PA  11/04/24 2194

## 2024-11-05 ENCOUNTER — ANESTHESIA EVENT (OUTPATIENT)
Dept: PERIOP | Facility: HOSPITAL | Age: 69
End: 2024-11-05
Payer: MEDICARE

## 2024-11-05 ENCOUNTER — APPOINTMENT (OUTPATIENT)
Dept: CT IMAGING | Facility: HOSPITAL | Age: 69
DRG: 025 | End: 2024-11-05
Payer: MEDICARE

## 2024-11-05 ENCOUNTER — ANESTHESIA (OUTPATIENT)
Dept: PERIOP | Facility: HOSPITAL | Age: 69
End: 2024-11-05
Payer: MEDICARE

## 2024-11-05 LAB
ALBUMIN SERPL-MCNC: 3.8 G/DL (ref 3.5–5.2)
ALBUMIN/GLOB SERPL: 1.7 G/DL
ALP SERPL-CCNC: 76 U/L (ref 39–117)
ALT SERPL W P-5'-P-CCNC: 23 U/L (ref 1–41)
ANION GAP SERPL CALCULATED.3IONS-SCNC: 12 MMOL/L (ref 5–15)
AST SERPL-CCNC: 17 U/L (ref 1–40)
BASOPHILS # BLD AUTO: 0.03 10*3/MM3 (ref 0–0.2)
BASOPHILS NFR BLD AUTO: 0.2 % (ref 0–1.5)
BILIRUB SERPL-MCNC: 0.6 MG/DL (ref 0–1.2)
BUN SERPL-MCNC: 20 MG/DL (ref 8–23)
BUN/CREAT SERPL: 18.9 (ref 7–25)
CALCIUM SPEC-SCNC: 9.8 MG/DL (ref 8.6–10.5)
CHLORIDE SERPL-SCNC: 102 MMOL/L (ref 98–107)
CO2 SERPL-SCNC: 23 MMOL/L (ref 22–29)
CREAT SERPL-MCNC: 1.06 MG/DL (ref 0.76–1.27)
DEPRECATED RDW RBC AUTO: 43 FL (ref 37–54)
EGFRCR SERPLBLD CKD-EPI 2021: 76 ML/MIN/1.73
EOSINOPHIL # BLD AUTO: 0.01 10*3/MM3 (ref 0–0.4)
EOSINOPHIL NFR BLD AUTO: 0.1 % (ref 0.3–6.2)
ERYTHROCYTE [DISTWIDTH] IN BLOOD BY AUTOMATED COUNT: 13.6 % (ref 12.3–15.4)
GLOBULIN UR ELPH-MCNC: 2.2 GM/DL
GLUCOSE BLDC GLUCOMTR-MCNC: 101 MG/DL (ref 70–130)
GLUCOSE BLDC GLUCOMTR-MCNC: 110 MG/DL (ref 70–130)
GLUCOSE BLDC GLUCOMTR-MCNC: 114 MG/DL (ref 70–130)
GLUCOSE BLDC GLUCOMTR-MCNC: 164 MG/DL (ref 70–130)
GLUCOSE SERPL-MCNC: 118 MG/DL (ref 65–99)
HCT VFR BLD AUTO: 42 % (ref 37.5–51)
HGB BLD-MCNC: 14.3 G/DL (ref 13–17.7)
IMM GRANULOCYTES # BLD AUTO: 0.07 10*3/MM3 (ref 0–0.05)
IMM GRANULOCYTES NFR BLD AUTO: 0.5 % (ref 0–0.5)
INR PPP: 1.06 (ref 0.89–1.12)
LYMPHOCYTES # BLD AUTO: 1.32 10*3/MM3 (ref 0.7–3.1)
LYMPHOCYTES NFR BLD AUTO: 8.6 % (ref 19.6–45.3)
MCH RBC QN AUTO: 29.4 PG (ref 26.6–33)
MCHC RBC AUTO-ENTMCNC: 34 G/DL (ref 31.5–35.7)
MCV RBC AUTO: 86.4 FL (ref 79–97)
MONOCYTES # BLD AUTO: 1.15 10*3/MM3 (ref 0.1–0.9)
MONOCYTES NFR BLD AUTO: 7.5 % (ref 5–12)
NEUTROPHILS NFR BLD AUTO: 12.69 10*3/MM3 (ref 1.7–7)
NEUTROPHILS NFR BLD AUTO: 83.1 % (ref 42.7–76)
NRBC BLD AUTO-RTO: 0 /100 WBC (ref 0–0.2)
PLATELET # BLD AUTO: 345 10*3/MM3 (ref 140–450)
PMV BLD AUTO: 10.7 FL (ref 6–12)
POTASSIUM SERPL-SCNC: 4.4 MMOL/L (ref 3.5–5.2)
PROT SERPL-MCNC: 6 G/DL (ref 6–8.5)
PROTHROMBIN TIME: 13.9 SECONDS (ref 12.2–14.5)
RBC # BLD AUTO: 4.86 10*6/MM3 (ref 4.14–5.8)
SODIUM SERPL-SCNC: 137 MMOL/L (ref 136–145)
WBC NRBC COR # BLD AUTO: 15.27 10*3/MM3 (ref 3.4–10.8)

## 2024-11-05 PROCEDURE — 25010000002 LIDOCAINE PF 1% 1 % SOLUTION: Performed by: ANESTHESIOLOGY

## 2024-11-05 PROCEDURE — 82948 REAGENT STRIP/BLOOD GLUCOSE: CPT

## 2024-11-05 PROCEDURE — 61154 BURR HOLE W/EVAC&/DRG HMTMA: CPT | Performed by: NEUROLOGICAL SURGERY

## 2024-11-05 PROCEDURE — 85610 PROTHROMBIN TIME: CPT | Performed by: NURSE PRACTITIONER

## 2024-11-05 PROCEDURE — 25010000002 CEFAZOLIN PER 500 MG: Performed by: NEUROLOGICAL SURGERY

## 2024-11-05 PROCEDURE — 25810000003 SODIUM CHLORIDE 0.9 % SOLUTION: Performed by: NEUROLOGICAL SURGERY

## 2024-11-05 PROCEDURE — 00940ZZ DRAINAGE OF INTRACRANIAL SUBDURAL SPACE, OPEN APPROACH: ICD-10-PCS | Performed by: NEUROLOGICAL SURGERY

## 2024-11-05 PROCEDURE — 25010000002 PROPOFOL 10 MG/ML EMULSION: Performed by: ANESTHESIOLOGY

## 2024-11-05 PROCEDURE — 25010000002 HYDRALAZINE PER 20 MG

## 2024-11-05 PROCEDURE — 92523 SPEECH SOUND LANG COMPREHEN: CPT

## 2024-11-05 PROCEDURE — 25010000002 HYDRALAZINE PER 20 MG: Performed by: ANESTHESIOLOGY

## 2024-11-05 PROCEDURE — 70450 CT HEAD/BRAIN W/O DYE: CPT

## 2024-11-05 PROCEDURE — 25010000002 FENTANYL CITRATE (PF) 100 MCG/2ML SOLUTION: Performed by: ANESTHESIOLOGY

## 2024-11-05 PROCEDURE — 25010000002 DEXAMETHASONE SODIUM PHOSPHATE 10 MG/ML SOLUTION: Performed by: PHYSICIAN ASSISTANT

## 2024-11-05 PROCEDURE — C1713 ANCHOR/SCREW BN/BN,TIS/BN: HCPCS | Performed by: NEUROLOGICAL SURGERY

## 2024-11-05 PROCEDURE — 99232 SBSQ HOSP IP/OBS MODERATE 35: CPT | Performed by: NURSE PRACTITIONER

## 2024-11-05 PROCEDURE — 85025 COMPLETE CBC W/AUTO DIFF WBC: CPT | Performed by: NURSE PRACTITIONER

## 2024-11-05 PROCEDURE — 61154 BURR HOLE W/EVAC&/DRG HMTMA: CPT

## 2024-11-05 PROCEDURE — 63710000001 INSULIN LISPRO (HUMAN) PER 5 UNITS: Performed by: NEUROLOGICAL SURGERY

## 2024-11-05 PROCEDURE — 25810000003 LACTATED RINGERS PER 1000 ML: Performed by: ANESTHESIOLOGY

## 2024-11-05 PROCEDURE — 99232 SBSQ HOSP IP/OBS MODERATE 35: CPT | Performed by: INTERNAL MEDICINE

## 2024-11-05 PROCEDURE — 97162 PT EVAL MOD COMPLEX 30 MIN: CPT

## 2024-11-05 PROCEDURE — 80053 COMPREHEN METABOLIC PANEL: CPT | Performed by: NURSE PRACTITIONER

## 2024-11-05 DEVICE — FLOSEAL WITH RECOTHROM - 10ML.
Type: IMPLANTABLE DEVICE | Site: CRANIAL | Status: FUNCTIONAL
Brand: FLOSEAL HEMOSTATIC MATRIX

## 2024-11-05 DEVICE — SSC BONE WAX
Type: IMPLANTABLE DEVICE | Site: CRANIAL | Status: FUNCTIONAL
Brand: SSC BONE WAX

## 2024-11-05 DEVICE — AVITENE ULTRAFOAM, 8 CM X 12.5 CM (3-1/8" X 5"), 100 SQ CM
Type: IMPLANTABLE DEVICE | Site: CRANIAL | Status: FUNCTIONAL
Brand: AVITENE

## 2024-11-05 RX ORDER — HYDROCODONE BITARTRATE AND ACETAMINOPHEN 5; 325 MG/1; MG/1
1 TABLET ORAL ONCE AS NEEDED
Status: DISCONTINUED | OUTPATIENT
Start: 2024-11-05 | End: 2024-11-05 | Stop reason: HOSPADM

## 2024-11-05 RX ORDER — SODIUM CHLORIDE 0.9 % (FLUSH) 0.9 %
3 SYRINGE (ML) INJECTION EVERY 12 HOURS SCHEDULED
Status: DISCONTINUED | OUTPATIENT
Start: 2024-11-05 | End: 2024-11-05 | Stop reason: HOSPADM

## 2024-11-05 RX ORDER — HYDRALAZINE HYDROCHLORIDE 20 MG/ML
5 INJECTION INTRAMUSCULAR; INTRAVENOUS
Status: DISCONTINUED | OUTPATIENT
Start: 2024-11-05 | End: 2024-11-05

## 2024-11-05 RX ORDER — SODIUM CHLORIDE 0.9 % (FLUSH) 0.9 %
10 SYRINGE (ML) INJECTION EVERY 12 HOURS SCHEDULED
Status: DISCONTINUED | OUTPATIENT
Start: 2024-11-05 | End: 2024-11-05 | Stop reason: HOSPADM

## 2024-11-05 RX ORDER — ONDANSETRON 2 MG/ML
4 INJECTION INTRAMUSCULAR; INTRAVENOUS ONCE AS NEEDED
Status: DISCONTINUED | OUTPATIENT
Start: 2024-11-05 | End: 2024-11-05

## 2024-11-05 RX ORDER — LOSARTAN POTASSIUM 25 MG/1
25 TABLET ORAL
Status: DISCONTINUED | OUTPATIENT
Start: 2024-11-05 | End: 2024-11-08 | Stop reason: HOSPADM

## 2024-11-05 RX ORDER — ONDANSETRON 2 MG/ML
4 INJECTION INTRAMUSCULAR; INTRAVENOUS ONCE AS NEEDED
Status: DISCONTINUED | OUTPATIENT
Start: 2024-11-05 | End: 2024-11-05 | Stop reason: HOSPADM

## 2024-11-05 RX ORDER — HYDROCODONE BITARTRATE AND ACETAMINOPHEN 5; 325 MG/1; MG/1
1 TABLET ORAL EVERY 4 HOURS PRN
Status: DISCONTINUED | OUTPATIENT
Start: 2024-11-05 | End: 2024-11-08 | Stop reason: HOSPADM

## 2024-11-05 RX ORDER — SODIUM CHLORIDE, SODIUM LACTATE, POTASSIUM CHLORIDE, CALCIUM CHLORIDE 600; 310; 30; 20 MG/100ML; MG/100ML; MG/100ML; MG/100ML
9 INJECTION, SOLUTION INTRAVENOUS CONTINUOUS
Status: ACTIVE | OUTPATIENT
Start: 2024-11-06 | End: 2024-11-06

## 2024-11-05 RX ORDER — DROPERIDOL 2.5 MG/ML
0.62 INJECTION, SOLUTION INTRAMUSCULAR; INTRAVENOUS ONCE AS NEEDED
Status: DISCONTINUED | OUTPATIENT
Start: 2024-11-05 | End: 2024-11-05 | Stop reason: HOSPADM

## 2024-11-05 RX ORDER — FAMOTIDINE 10 MG/ML
20 INJECTION, SOLUTION INTRAVENOUS ONCE
Status: DISCONTINUED | OUTPATIENT
Start: 2024-11-05 | End: 2024-11-05 | Stop reason: HOSPADM

## 2024-11-05 RX ORDER — FAMOTIDINE 20 MG/1
20 TABLET, FILM COATED ORAL ONCE
Status: DISCONTINUED | OUTPATIENT
Start: 2024-11-05 | End: 2024-11-05 | Stop reason: HOSPADM

## 2024-11-05 RX ORDER — HYDRALAZINE HYDROCHLORIDE 20 MG/ML
5 INJECTION INTRAMUSCULAR; INTRAVENOUS
Status: DISCONTINUED | OUTPATIENT
Start: 2024-11-05 | End: 2024-11-05 | Stop reason: HOSPADM

## 2024-11-05 RX ORDER — EPHEDRINE SULFATE 50 MG/ML
INJECTION INTRAVENOUS AS NEEDED
Status: DISCONTINUED | OUTPATIENT
Start: 2024-11-05 | End: 2024-11-05 | Stop reason: SURG

## 2024-11-05 RX ORDER — SODIUM CHLORIDE 0.9 % (FLUSH) 0.9 %
3-10 SYRINGE (ML) INJECTION AS NEEDED
Status: DISCONTINUED | OUTPATIENT
Start: 2024-11-05 | End: 2024-11-05 | Stop reason: HOSPADM

## 2024-11-05 RX ORDER — IPRATROPIUM BROMIDE AND ALBUTEROL SULFATE 2.5; .5 MG/3ML; MG/3ML
3 SOLUTION RESPIRATORY (INHALATION) ONCE AS NEEDED
Status: DISCONTINUED | OUTPATIENT
Start: 2024-11-05 | End: 2024-11-05 | Stop reason: HOSPADM

## 2024-11-05 RX ORDER — SODIUM CHLORIDE 9 MG/ML
9 INJECTION, SOLUTION INTRAVENOUS AS NEEDED
Status: DISCONTINUED | OUTPATIENT
Start: 2024-11-05 | End: 2024-11-05 | Stop reason: HOSPADM

## 2024-11-05 RX ORDER — HYDROMORPHONE HYDROCHLORIDE 1 MG/ML
0.5 INJECTION, SOLUTION INTRAMUSCULAR; INTRAVENOUS; SUBCUTANEOUS
Status: DISCONTINUED | OUTPATIENT
Start: 2024-11-05 | End: 2024-11-05 | Stop reason: HOSPADM

## 2024-11-05 RX ORDER — SODIUM CHLORIDE 9 MG/ML
50 INJECTION, SOLUTION INTRAVENOUS CONTINUOUS
Status: DISCONTINUED | OUTPATIENT
Start: 2024-11-05 | End: 2024-11-06

## 2024-11-05 RX ORDER — SODIUM CHLORIDE, SODIUM LACTATE, POTASSIUM CHLORIDE, CALCIUM CHLORIDE 600; 310; 30; 20 MG/100ML; MG/100ML; MG/100ML; MG/100ML
9 INJECTION, SOLUTION INTRAVENOUS CONTINUOUS
Status: DISCONTINUED | OUTPATIENT
Start: 2024-11-05 | End: 2024-11-05

## 2024-11-05 RX ORDER — ENALAPRILAT 1.25 MG/ML
0.62 INJECTION INTRAVENOUS ONCE
Status: COMPLETED | OUTPATIENT
Start: 2024-11-05 | End: 2024-11-05

## 2024-11-05 RX ORDER — DROPERIDOL 2.5 MG/ML
0.62 INJECTION, SOLUTION INTRAMUSCULAR; INTRAVENOUS
Status: DISCONTINUED | OUTPATIENT
Start: 2024-11-05 | End: 2024-11-05 | Stop reason: HOSPADM

## 2024-11-05 RX ORDER — DEXAMETHASONE SODIUM PHOSPHATE 10 MG/ML
10 INJECTION, SOLUTION INTRAMUSCULAR; INTRAVENOUS
Status: COMPLETED | OUTPATIENT
Start: 2024-11-05 | End: 2024-11-05

## 2024-11-05 RX ORDER — MEPERIDINE HYDROCHLORIDE 25 MG/ML
12.5 INJECTION INTRAMUSCULAR; INTRAVENOUS; SUBCUTANEOUS
Status: DISCONTINUED | OUTPATIENT
Start: 2024-11-05 | End: 2024-11-05

## 2024-11-05 RX ORDER — MIDAZOLAM HYDROCHLORIDE 1 MG/ML
0.5 INJECTION, SOLUTION INTRAMUSCULAR; INTRAVENOUS
Status: DISCONTINUED | OUTPATIENT
Start: 2024-11-05 | End: 2024-11-05 | Stop reason: HOSPADM

## 2024-11-05 RX ORDER — FOLIC ACID 1 MG/1
1 TABLET ORAL DAILY
Status: DISCONTINUED | OUTPATIENT
Start: 2024-11-05 | End: 2024-11-08 | Stop reason: HOSPADM

## 2024-11-05 RX ORDER — ISOSORBIDE MONONITRATE 30 MG/1
30 TABLET, EXTENDED RELEASE ORAL DAILY
Status: DISCONTINUED | OUTPATIENT
Start: 2024-11-05 | End: 2024-11-08 | Stop reason: HOSPADM

## 2024-11-05 RX ORDER — SODIUM CHLORIDE 9 MG/ML
40 INJECTION, SOLUTION INTRAVENOUS AS NEEDED
Status: DISCONTINUED | OUTPATIENT
Start: 2024-11-05 | End: 2024-11-05 | Stop reason: HOSPADM

## 2024-11-05 RX ORDER — MAGNESIUM HYDROXIDE 1200 MG/15ML
LIQUID ORAL AS NEEDED
Status: DISCONTINUED | OUTPATIENT
Start: 2024-11-05 | End: 2024-11-05 | Stop reason: HOSPADM

## 2024-11-05 RX ORDER — PROPOFOL 10 MG/ML
VIAL (ML) INTRAVENOUS AS NEEDED
Status: DISCONTINUED | OUTPATIENT
Start: 2024-11-05 | End: 2024-11-05 | Stop reason: SURG

## 2024-11-05 RX ORDER — FENTANYL CITRATE 50 UG/ML
50 INJECTION, SOLUTION INTRAMUSCULAR; INTRAVENOUS
Status: DISCONTINUED | OUTPATIENT
Start: 2024-11-05 | End: 2024-11-05 | Stop reason: HOSPADM

## 2024-11-05 RX ORDER — SODIUM CHLORIDE 0.9 % (FLUSH) 0.9 %
10 SYRINGE (ML) INJECTION AS NEEDED
Status: DISCONTINUED | OUTPATIENT
Start: 2024-11-05 | End: 2024-11-05 | Stop reason: HOSPADM

## 2024-11-05 RX ORDER — FAMOTIDINE 20 MG/1
20 TABLET, FILM COATED ORAL
Status: COMPLETED | OUTPATIENT
Start: 2024-11-05 | End: 2024-11-05

## 2024-11-05 RX ORDER — LANOLIN ALCOHOL/MO/W.PET/CERES
1000 CREAM (GRAM) TOPICAL DAILY
Status: DISCONTINUED | OUTPATIENT
Start: 2024-11-06 | End: 2024-11-08 | Stop reason: HOSPADM

## 2024-11-05 RX ORDER — LIDOCAINE HYDROCHLORIDE 10 MG/ML
0.5 INJECTION, SOLUTION EPIDURAL; INFILTRATION; INTRACAUDAL; PERINEURAL ONCE AS NEEDED
Status: DISCONTINUED | OUTPATIENT
Start: 2024-11-05 | End: 2024-11-05 | Stop reason: HOSPADM

## 2024-11-05 RX ORDER — FENTANYL CITRATE 50 UG/ML
INJECTION, SOLUTION INTRAMUSCULAR; INTRAVENOUS AS NEEDED
Status: DISCONTINUED | OUTPATIENT
Start: 2024-11-05 | End: 2024-11-05

## 2024-11-05 RX ORDER — HYDRALAZINE HYDROCHLORIDE 20 MG/ML
INJECTION INTRAMUSCULAR; INTRAVENOUS
Status: COMPLETED
Start: 2024-11-05 | End: 2024-11-05

## 2024-11-05 RX ORDER — LEVETIRACETAM 750 MG/1
750 TABLET ORAL 2 TIMES DAILY
Status: DISCONTINUED | OUTPATIENT
Start: 2024-11-05 | End: 2024-11-08 | Stop reason: HOSPADM

## 2024-11-05 RX ORDER — ATORVASTATIN CALCIUM 40 MG/1
80 TABLET, FILM COATED ORAL NIGHTLY
Status: DISCONTINUED | OUTPATIENT
Start: 2024-11-05 | End: 2024-11-08 | Stop reason: HOSPADM

## 2024-11-05 RX ORDER — FENTANYL CITRATE 50 UG/ML
50 INJECTION, SOLUTION INTRAMUSCULAR; INTRAVENOUS
Status: DISCONTINUED | OUTPATIENT
Start: 2024-11-05 | End: 2024-11-05

## 2024-11-05 RX ORDER — LABETALOL HYDROCHLORIDE 5 MG/ML
5 INJECTION, SOLUTION INTRAVENOUS
Status: DISCONTINUED | OUTPATIENT
Start: 2024-11-05 | End: 2024-11-05 | Stop reason: HOSPADM

## 2024-11-05 RX ORDER — ROCURONIUM BROMIDE 10 MG/ML
INJECTION, SOLUTION INTRAVENOUS AS NEEDED
Status: DISCONTINUED | OUTPATIENT
Start: 2024-11-05 | End: 2024-11-05 | Stop reason: SURG

## 2024-11-05 RX ORDER — LIDOCAINE HYDROCHLORIDE 10 MG/ML
INJECTION, SOLUTION EPIDURAL; INFILTRATION; INTRACAUDAL; PERINEURAL AS NEEDED
Status: DISCONTINUED | OUTPATIENT
Start: 2024-11-05 | End: 2024-11-05 | Stop reason: SURG

## 2024-11-05 RX ORDER — PROMETHAZINE HYDROCHLORIDE 25 MG/1
25 TABLET ORAL ONCE AS NEEDED
Status: DISCONTINUED | OUTPATIENT
Start: 2024-11-05 | End: 2024-11-05 | Stop reason: HOSPADM

## 2024-11-05 RX ORDER — PROMETHAZINE HYDROCHLORIDE 25 MG/1
25 SUPPOSITORY RECTAL ONCE AS NEEDED
Status: DISCONTINUED | OUTPATIENT
Start: 2024-11-05 | End: 2024-11-05 | Stop reason: HOSPADM

## 2024-11-05 RX ORDER — NALOXONE HCL 0.4 MG/ML
0.4 VIAL (ML) INJECTION AS NEEDED
Status: DISCONTINUED | OUTPATIENT
Start: 2024-11-05 | End: 2024-11-05 | Stop reason: HOSPADM

## 2024-11-05 RX ADMIN — LOSARTAN POTASSIUM 25 MG: 25 TABLET, FILM COATED ORAL at 09:13

## 2024-11-05 RX ADMIN — FOLIC ACID 1 MG: 1 TABLET ORAL at 20:19

## 2024-11-05 RX ADMIN — HYDRALAZINE HYDROCHLORIDE 5 MG: 20 INJECTION INTRAMUSCULAR; INTRAVENOUS at 17:16

## 2024-11-05 RX ADMIN — SODIUM CHLORIDE 2000 MG: 900 INJECTION INTRAVENOUS at 16:05

## 2024-11-05 RX ADMIN — FENTANYL CITRATE 100 MCG: 50 INJECTION, SOLUTION INTRAMUSCULAR; INTRAVENOUS at 15:59

## 2024-11-05 RX ADMIN — Medication 10 ML: at 09:14

## 2024-11-05 RX ADMIN — ACETAMINOPHEN 650 MG: 325 TABLET ORAL at 09:13

## 2024-11-05 RX ADMIN — PROPOFOL 180 MG: 10 INJECTION, EMULSION INTRAVENOUS at 15:59

## 2024-11-05 RX ADMIN — PROPOFOL 25 MCG/KG/MIN: 10 INJECTION, EMULSION INTRAVENOUS at 16:20

## 2024-11-05 RX ADMIN — ISOSORBIDE MONONITRATE 30 MG: 30 TABLET, EXTENDED RELEASE ORAL at 20:19

## 2024-11-05 RX ADMIN — DEXAMETHASONE SODIUM PHOSPHATE 4 MG: 10 INJECTION, SOLUTION INTRAMUSCULAR; INTRAVENOUS at 15:59

## 2024-11-05 RX ADMIN — ACETAMINOPHEN 650 MG: 325 TABLET ORAL at 04:47

## 2024-11-05 RX ADMIN — HYDRALAZINE HYDROCHLORIDE 5 MG: 20 INJECTION INTRAMUSCULAR; INTRAVENOUS at 17:00

## 2024-11-05 RX ADMIN — SODIUM CHLORIDE, POTASSIUM CHLORIDE, SODIUM LACTATE AND CALCIUM CHLORIDE 9 ML/HR: 600; 310; 30; 20 INJECTION, SOLUTION INTRAVENOUS at 15:33

## 2024-11-05 RX ADMIN — INSULIN LISPRO 2 UNITS: 100 INJECTION, SOLUTION INTRAVENOUS; SUBCUTANEOUS at 20:24

## 2024-11-05 RX ADMIN — EPHEDRINE SULFATE 10 MG: 50 INJECTION INTRAVENOUS at 16:08

## 2024-11-05 RX ADMIN — ENALAPRILAT 0.62 MG: 1.25 INJECTION INTRAVENOUS at 04:37

## 2024-11-05 RX ADMIN — LIDOCAINE HYDROCHLORIDE 50 MG: 10 INJECTION, SOLUTION EPIDURAL; INFILTRATION; INTRACAUDAL; PERINEURAL at 16:00

## 2024-11-05 RX ADMIN — FAMOTIDINE 20 MG: 20 TABLET, FILM COATED ORAL at 14:29

## 2024-11-05 RX ADMIN — ATORVASTATIN CALCIUM 80 MG: 40 TABLET, FILM COATED ORAL at 20:19

## 2024-11-05 RX ADMIN — LEVETIRACETAM 750 MG: 750 TABLET, FILM COATED ORAL at 20:19

## 2024-11-05 RX ADMIN — SODIUM CHLORIDE 50 ML/HR: 9 INJECTION, SOLUTION INTRAVENOUS at 18:29

## 2024-11-05 RX ADMIN — ROCURONIUM BROMIDE 50 MG: 10 SOLUTION INTRAVENOUS at 15:59

## 2024-11-05 NOTE — PROGRESS NOTES
[facility]  PROGRESS NOTE    Patient Name: Manjit Thomas  : 1955  MRN: [id number]    Date of Admission: 2024  Primary Care Physician: Ariel Lee DO    Subjective   Subjective     CC: f/u SDH    HPI: Up in bed. Still with HA. Asking for cheeseburger.      Objective   Objective     Vital Signs:   Temp:  [97.8 °F (36.6 °C)-98.9 °F (37.2 °C)] 98.5 °F (36.9 °C)  Heart Rate:  [45-64] 45  Resp:  [16-18] 16  BP: (111-155)/(65-89) 151/74     Physical Exam:  Constitutional: No acute distress, awake, alert  HENT: NCAT, mucous membranes moist  Respiratory: Clear to auscultation bilaterally, respiratory effort normal   Cardiovascular: RRR, no murmurs, rubs, or gallops  Gastrointestinal: Positive bowel sounds, soft, nontender, nondistended  Musculoskeletal: No bilateral ankle edema  Psychiatric: Appropriate affect, cooperative  Neurologic: Oriented x 3, strength symmetric in all extremities, Cranial Nerves grossly intact to confrontation, speech clear  Skin: No rashes     Results Reviewed:  LAB RESULTS:      Lab 24  0751 24  1348   WBC 15.27* 14.71*   HEMOGLOBIN 14.3 14.8   HEMATOCRIT 42.0 43.4   PLATELETS 345 329   NEUTROS ABS 12.69* 12.50*   IMMATURE GRANS (ABS) 0.07* 0.09*   LYMPHS ABS 1.32 1.15   MONOS ABS 1.15* 0.92*   EOS ABS 0.01 0.02   MCV 86.4 86.8   PROTIME 13.9 13.6   APTT  --  27.6         Lab 24  0751 24  1348   SODIUM 137 137   POTASSIUM 4.4 4.2   CHLORIDE 102 103   CO2 23.0 24.1   ANION GAP 12.0 9.9   BUN 20 20   CREATININE 1.06 1.07   EGFR 76.0 75.1   GLUCOSE 118* 166*   CALCIUM 9.8 9.7         Lab 24  0751 24  1348   TOTAL PROTEIN 6.0 6.7   ALBUMIN 3.8 3.8   GLOBULIN 2.2 2.9   ALT (SGPT) 23 25   AST (SGOT) 17 25   BILIRUBIN 0.6 0.6   ALK PHOS 76 88         Lab 24  0751 24  1348   PROTIME 13.9 13.6   INR 1.06 1.03                 Brief Urine Lab Results       None            Microbiology Results  Abnormal       None            CT Head Without Contrast    Result Date: 11/4/2024  PROCEDURE: CT HEAD WO CONTRAST-  HISTORY: s/p MMA emobolization; I62.00-Nontraumatic subdural hemorrhage, unspecified  COMPARISON: 10/27/2024.  TECHNIQUE: Multiple axial CT images were performed from the foramen magnum to the vertex without enhancement. This study was performed with techniques to keep radiation doses as low as reasonably achievable (ALARA). Individualized dose reduction techniques using automated exposure control or adjustment of mA and/or kV according to the patient size were employed.  FINDINGS: There is an acute on chronic right subdural hematoma overlying the right frontal and parietal lobes which has a maximum thickness of 1.6 cm. There is mass effect upon the underlying right cerebral hemisphere with 10 mm of leftward midline shift. The suprasellar cistern is patent. There is no hydrocephalus. There is a small chronic left subdural hematoma. The paranasal sinuses are clear. Bone windows reveal no acute osseous abnormalities.      Impression: Acute on chronic right subdural hematoma with 1 cm of leftward midline shift. The patient was instructed to go to the emergency department for further evaluation as this was an outpatient exam.     This report was finalized on 11/4/2024 1:04 PM by Lynette López M.D..           Current medications:  Scheduled Meds:insulin lispro, 2-7 Units, Subcutaneous, 4x Daily AC & at Bedtime  losartan, 25 mg, Oral, Q24H  sodium chloride, 10 mL, Intravenous, Q12H      Continuous Infusions:   PRN Meds:.  acetaminophen **OR** acetaminophen **OR** acetaminophen    acetaminophen    senna-docusate sodium **AND** polyethylene glycol **AND** bisacodyl **AND** bisacodyl    dextrose    dextrose    glucagon (human recombinant)    sodium chloride    sodium chloride    Assessment & Plan   Assessment & Plan     Active Hospital Problems    Diagnosis  POA    **SDH (subdural hematoma) [S06.5XAA]  Yes  "   Headache [R51.9]  Yes    Type 2 diabetes mellitus, without long-term current use of insulin [E11.9]  Yes    HTN (hypertension) [I10]  Yes    Coronary atherosclerosis due to lipid rich plaque [I25.83]  Yes    Generalized weakness [R53.1]  Yes    Subdural hematoma, nontraumatic [I62.00]  Yes      Resolved Hospital Problems   No resolved problems to display.        Brief Hospital Course to date:  Manjit Thomas is a 69 y.o. male with past medical history significant for DM type II, HTN, HLD, CAD, and recent SDH.  Patient was admitted at [facility] 10/23 and underwent MMA embolization 10/24 with Dr. Jenkins.  Developed bradycardia followed by cardiology and seizure-like activity followed by neurology started on Keppra.  Discharged home 10/27/2024.  Has been off his aspirin and Plavix since 10/23/2024.     Patient presents to PCP today with increasing headaches x 1 week with associated decreased appetite and nausea.  No focal deficits.  Sent to [facility] ER for evaluation.  CT of the head showed acute on chronic subdural hematoma with a 1 cm left shift.  She was transferred to [facility] with Dr. Jenkins with neurosurgery aware and agreed to follow.  Plans for gavin hole placement later this week.  On exam patient awake and alert.  Oriented x 4.  Complaining of headache center frontal head rated 5/10 scale.  No visual deficits.  NIH 0.  Will admit to hospital medicine service and consult to neurosurgery to follow.     Acute/chronic SDH  Worsening frontal headache  Seizure-like activity (noted on recent admit started on Keppra)  -- Follow-up from [facility] today.  Accepted by Dr. Jenkins NS.  -- Recent MCA embolization 10/24 here with Dr. Jenkins  -- Recent admit 10/23 - 10/27 with new \"seizure-like activity started on Keppra followed by neurology.  Consider consult.  -- Plan for gavin holes later this week with Dr. Jenkins.  On schedule questionable 11/6.     HTN/HLD/CAD  Recent bradycardia (prior " admission)  --Followed by cardiology last admit  -- Home meds as tolerated.  -- Holding any antiplatelet/anticoagulant     DM type II (A1c 5.7)  -- Hold any home oral agents  -- Accu-Doloresks SASHA ARCHER for now     Obesity: BMI 30.82    Expected Discharge Location and Transportation:   Expected Discharge   Expected discharge date/ time has not been documented.     VTE Prophylaxis:  Mechanical VTE prophylaxis orders are present.         AM-PAC 6 Clicks Score (PT): 19 (11/05/24 4060)    CODE STATUS:   Code Status and Medical Interventions: CPR (Attempt to Resuscitate); Full Support   Ordered at: 11/04/24 0047     Level Of Support Discussed With:    Patient     Code Status (Patient has no pulse and is not breathing):    CPR (Attempt to Resuscitate)     Medical Interventions (Patient has pulse or is breathing):    Full Support       Yaa Sweeney II, DO  11/05/24

## 2024-11-05 NOTE — PLAN OF CARE
Goal Outcome Evaluation:  Plan of Care Reviewed With: patient                Anticipated Discharge Disposition (SLP): home with home health, home with assist    SLP Diagnosis: mild, cognitive-linguistic disorder (11/05/24 9425)

## 2024-11-05 NOTE — ANESTHESIA PROCEDURE NOTES
Airway  Urgency: elective    Date/Time: 11/5/2024 4:00 PM  Airway not difficult    General Information and Staff    Patient location during procedure: OR    Indications and Patient Condition  Indications for airway management: airway protection    Preoxygenated: yes  MILS not maintained throughout  Mask difficulty assessment: 1 - vent by mask    Final Airway Details  Final airway type: endotracheal airway      Successful airway: ETT  Cuffed: yes   Successful intubation technique: video laryngoscopy  Facilitating devices/methods: intubating stylet  Endotracheal tube insertion site: oral  Blade: Chuck  Blade size: 4  ETT size (mm): 7.0  Cormack-Lehane Classification: grade I - full view of glottis  Placement verified by: chest auscultation and capnometry   Measured from: lips  ETT/EBT  to lips (cm): 20  Number of attempts at approach: 1  Assessment: lips, teeth, and gum same as pre-op and atraumatic intubation    Additional Comments  Negative epigastric sounds, Breath sound equal bilaterally with symmetric chest rise and fall           VTE Assessment already completed for this visit

## 2024-11-05 NOTE — CASE MANAGEMENT/SOCIAL WORK
Discharge Planning Assessment  [facility]     Patient Name: Manjit Thomas  MRN: [id number]  Today's Date: 11/5/2024    Admit Date: 11/4/2024    Plan: Home   Discharge Needs Assessment       Row Name 11/05/24 1210       Living Environment    People in Home spouse    Current Living Arrangements home    Potentially Unsafe Housing Conditions none    Primary Care Provided by self    Provides Primary Care For no one    Family Caregiver if Needed spouse    Quality of Family Relationships supportive    Able to Return to Prior Arrangements yes       Resource/Environmental Concerns    Resource/Environmental Concerns none    Transportation Concerns none       Transition Planning    Patient/Family Anticipates Transition to home with family    Patient/Family Anticipated Services at Transition none    Transportation Anticipated family or friend will provide       Discharge Needs Assessment    Equipment Currently Used at Home none    Concerns to be Addressed no discharge needs identified    Equipment Needed After Discharge none    Provided Post Acute Provider List? N/A    N/A Provider List Comment Unsure if will have any needs but denies any needs at this time    Provided Post Acute Provider Quality & Resource List? N/A                   Discharge Plan       Row Name 11/05/24 1212       Plan    Plan Home    Patient/Family in Agreement with Plan yes    Plan Comments MSW spoke with pt at bedside. Prior to admission pt is independent in ADL's and IADL's and does not use any DME. Pt lives in [place] with his wife. Pt's PCP is Ariel Lee. Pt has prescription coverage with his health insurance. Pt worked with physical therapy who recommended home health. MSW discussed this with pt and pt's wife. Pt and pt's wife report they do not feel pt needs Home health at this time but will see how pt is doing prior to discharge. MSW continues to follow for discharge needs as arise.    Final Discharge Disposition Code 01 - home or  self-care                  Continued Care and Services - Admitted Since 11/4/2024    No active coordination exists for this encounter.          Demographic Summary       Row Name 11/05/24 1210       General Information    Reason for Consult discharge planning                   Functional Status       Row Name 11/05/24 1210       Functional Status, IADL    Medications independent    Meal Preparation independent    Housekeeping independent    Laundry independent    Shopping independent                   Psychosocial    No documentation.                  Abuse/Neglect    No documentation.                  Legal    No documentation.                  Substance Abuse    No documentation.                  Patient Forms    No documentation.                     SUKHDEV Reyna

## 2024-11-05 NOTE — THERAPY EVALUATION
Acute Care - Speech Language Pathology Initial Evaluation   [place]  Cognitive-Communication Evaluation     Patient Name: Manjit Thomas  : 1955  MRN: [id number]  Today's Date: 2024               Admit Date: 2024     Visit Dx:    ICD-10-CM ICD-9-CM   1. SDH (subdural hematoma)  S06.5XAA 432.1   2. Cognitive communication disorder  R41.841 315.32     Patient Active Problem List   Diagnosis    Type 2 diabetes mellitus, without long-term current use of insulin    HLD (hyperlipidemia)    HTN (hypertension)    Coronary atherosclerosis due to lipid rich plaque    Generalized weakness    Subdural hematoma, nontraumatic    Subarachnoid hemorrhage    SDH (subdural hematoma)    Headache     Past Medical History:   Diagnosis Date    Coronary atherosclerosis     Diabetes mellitus     Headache     HLD (hyperlipidemia)     Hypertension     Vitamin B12 deficiency      Past Surgical History:   Procedure Laterality Date    CORONARY ANGIOPLASTY WITH STENT PLACEMENT N/A     EMBOLIZATION CEREBRAL N/A 10/24/2024    Procedure: MMA Emoblization;  Surgeon: Manjit Jenkins MD;  Location: [facility] INVASIVE LOCATION;  Service: Interventional Radiology;  Laterality: N/A;       SLP Recommendation and Plan  SLP Diagnosis: mild, cognitive-linguistic disorder (24)              SLC Criteria for Skilled Therapy Interventions Met: yes (24)  Anticipated Discharge Disposition (SLP): home with home health, home with assist (24)        Therapy Frequency (SLP SLC): 5 days per week (24)  Predicted Duration Therapy Intervention (Days): 1 week (24)                                    SLP EVALUATION (Last 72 Hours)       SLP SLC Evaluation       Row Name 24                   Communication Assessment/Intervention    Document Type evaluation  -AC        Subjective Information no complaints  -AC        Patient Observations alert;cooperative  -AC         Patient/Family/Caregiver Comments/Observations No family present.  -AC        Patient Effort good  -AC           General Information    Patient Profile Reviewed yes  -AC        Pertinent History Of Current Problem Acute on chronic R SDH (non-traumatic) w/ L midline shift. Viola hole pending.  -AC        Precautions/Limitations, Vision WFL;for purposes of eval  -AC        Precautions/Limitations, Hearing WFL;for purposes of eval  -AC        Patient Level of Education GED  -AC        Prior Level of Function-Communication WFL;other (see comments)  reported some degree of what he believes is age-related memory difficulty  -AC        Plans/Goals Discussed with patient;agreed upon  -AC        Barriers to Rehab none identified  -AC        Patient's Goals for Discharge return to home;take care of myself at home  -AC           Pain    Pretreatment Pain Rating 0/10 - no pain  -AC        Posttreatment Pain Rating 0/10 - no pain  -AC           Comprehension Assessment/Intervention    Comprehension Assessment/Intervention Auditory Comprehension  -AC           Auditory Comprehension Assessment/Intervention    Auditory Comprehension (Communication) WFL  -AC        Answers Questions (Communication) WFL;complex;yes/no  -AC        Able to Follow Commands (Communication) WFL;2-step  -AC        Narrative Discourse WFL;conversational level  -AC           Expression Assessment/Intervention    Expression Assessment/Intervention verbal expression  -AC           Verbal Expression Assessment/Intervention    Verbal Expression WFL  -AC        Automatic Speech (Communication) WFL;response to greeting  -AC        Responsive Naming WFL;simple  -AC        Confrontational Naming WFL;high frequency  -AC        Conversational Discourse/Fluency WFL  -AC           Oral Musculature and Cranial Nerve Assessment    Oral Motor General Assessment WFL  -AC           Motor Speech Assessment/Intervention    Motor Speech Function WFL;unfamiliar listener  -AC         Conversational Speech (Communication) WFL;simple  -AC        Speech intelligibility 100%;in quiet environment;in connected speech;with unfamiliar listener  -AC           Cursory Voice Assessment/Intervention    Quality and Resonance (Voice) WFL  -AC           Cognitive Assessment Intervention- SLP    Cognitive Function (Cognition) mild impairment  -AC        Orientation Status (Cognition) WFL;person;place;time;situation  -AC        Memory (Cognitive) moderate impairment;short-term;immediate;unrelated;other (see comments)  3/5 words  -AC        Attention (Cognitive) WFL;sustained  -AC        Thought Organization (Cognitive) mild impairment;mental manipulation;WFL;abstract convergent;drawing conclusions  -AC        Reasoning (Cognitive) mild impairment;mental flexibility;WFL;deductive;analogies  -AC        Problem Solving (Cognitive) WFL;temporal  -AC        Functional Math (Cognitive) mild impairment;money calculation  -AC           SLP Evaluation Clinical Impressions    SLP Diagnosis mild;cognitive-linguistic disorder  -AC        Rehab Potential/Prognosis good  -AC        SLC Criteria for Skilled Therapy Interventions Met yes  -AC        Functional Impact difficulty completing home management task  -AC           Recommendations    Therapy Frequency (SLP SLC) 5 days per week  -AC        Predicted Duration Therapy Intervention (Days) 1 week  -AC        Anticipated Discharge Disposition (SLP) home with home health;home with assist  -AC                  User Key  (r) = Recorded By, (t) = Taken By, (c) = Cosigned By      Initials Name Effective Dates    AC Raisa Garcia MS [facility]-SLP 02/03/23 -                        EDUCATION  The patient has been educated in the following areas:     Cognitive Impairment.           SLP GOALS       Row Name 11/05/24 1040             Patient will demonstrate functional cognitive-linguistic skills for return to discharge environment    [place] with minimal cues  -AC      Time frame  1 week  -AC         Memory Skills Goal 1 (SLP)    Improve Memory Skills Through Goal 1 (SLP) recalling unrelated word lists immediately;use memory strategies;90%;with minimal cues (75-90%)  -AC      Time Frame (Memory Skills Goal 1, SLP) 1 week  -AC         Organizational Skills Goal 1 (SLP)    Improve Thought Organization Through Goal 1 (SLP) completing mental manipulation task;90%;with minimal cues (75-90%)  -AC      Time Frame (Thought Organization Skills Goal 1, SLP) 1 week  -AC         Reasoning Goal 1 (SLP)    Improve Reasoning Through Goal 1 (SLP) complete mental flexibility task;90%;with minimal cues (75-90%)  -AC      Time Frame (Reasoning Goal 1, SLP) 1 week  -AC         Functional Math Skills Goal 1 (SLP)    Improve Functional Math Skills Through Goal 1 (SLP) complete word problems involving time;complete word problems involving money;90%;with minimal cues (75-90%)  -AC      Time Frame (Functional Math Skills Goal 1, SLP) 1 week  -AC                User Key  (r) = Recorded By, (t) = Taken By, (c) = Cosigned By      Initials Name Provider Type    Raisa Wets MS CCC-SLP Speech and Language Pathologist                              Time Calculation:      Time Calculation- SLP       Row Name 11/05/24 1132             Time Calculation- SLP    SLP Start Time 1040  -AC      SLP Received On 11/05/24  -AC         Untimed Charges    13603-VJ Eval Speech and Production w/ Language Minutes 50  -AC         Total Minutes    Untimed Charges Total Minutes 50  -AC       Total Minutes 50  -AC                User Key  (r) = Recorded By, (t) = Taken By, (c) = Cosigned By      Initials Name Provider Type    Raisa West MS CCC-SLP Speech and Language Pathologist                    Therapy Charges for Today       Code Description Service Date Service Provider Modifiers Qty    [id number] HC ST EVAL SPEECH AND PROD W LANG  3 11/5/2024 Raisa Garcia MS CCC-SLP GN 1                       Raisa Garcia MS  CCC-SLP  11/5/2024

## 2024-11-05 NOTE — PROGRESS NOTES
"INTENSIVIST NOTE     SUBJECTIVE     Mr. Thomas is a 69 year old male with a pmhx of type II diabetes, HTN, HLD, CAD, s/p JESSE x 2 (2019), and recent SDH. He was admitted at [facility] on 10/23 and underwent MMA embolization 10/24. He developed bradycardia and seizure-like activity for which he was started on Keppra. He was discontinued home on 10/27. He presented to PCP for increasing headaches x 1 week associated decreased appetite and nausea. He was sent to [facility] ER for evaluation. CTH revealed acute on chronic subdural hematoma with a 1 cm left shift. He was subsequently transferred to [facility] for neurosurgery consultation. Patient was taken to the OR today to undergo gavin hole placement (1 anterior 1 posterior). Post-operatively, he was transferred to the ICU for higher level of care. Upon arrival to the ICU, patient is resting in bed. He reports some discomfort at gavin hole sites.      The patient's relevant past medical, surgical and social history were reviewed and updated in Epic as appropriate.       OBJECTIVE     Vital Sign Min/Max for last 24 hours  Temp  Min: 97.8 °F (36.6 °C)  Max: 98.5 °F (36.9 °C)   BP  Min: 139/70  Max: 195/83   Pulse  Min: 45  Max: 72   Resp  Min: 13  Max: 20   SpO2  Min: 92 %  Max: 98 %   No data recorded   No data recorded      Intake/Output Summary (Last 24 hours) at 11/5/2024 1809  Last data filed at 11/5/2024 1605  Gross per 24 hour   Intake 100 ml   Output 250 ml   Net -150 ml      Flowsheet Rows      Flowsheet Row First Filed Value   Admission Height 180.3 cm (71\") Documented at 11/04/2024 1642   Admission Weight 100 kg (221 lb) Documented at 11/04/2024 1700               11/04/24  1700   Weight: 100 kg (221 lb)            Objective:  General Appearance:  Comfortable and well-appearing.    Vital signs: (most recent): Blood pressure 145/75, pulse 72, temperature 98 °F (36.7 °C), temperature source Oral, resp. rate 16, height 180.3 cm (71\"), weight 100 kg (221 lb), SpO2 92%.  Vital " signs are normal.    Lungs:  Normal effort and normal respiratory rate.  Breath sounds clear to auscultation.    Heart: Normal rate.  Regular rhythm.  S1 normal and S2 normal.    Abdomen: Abdomen is soft and non-distended.  Bowel sounds are normal.   There is no abdominal tenderness.     Extremities: Normal range of motion.    Pulses: Distal pulses are intact.    Neurological: Patient is alert and oriented to person, place and time.  Normal strength.    Skin:  Warm and dry.  (Dressing over gavin holes is c/d/I.)              I reviewed the patient's new clinical results.  I reviewed the patient's new imaging results/reports including actual images and agree with reports.      INFUSIONS  [START ON 11/6/2024] lactated ringers, 9 mL/hr, Last Rate: 9 mL/hr (11/05/24 1553)  lactated ringers, 9 mL/hr  niCARdipine, 5-15 mg/hr  sodium chloride, 50 mL/hr        Results from last 7 days   Lab Units 11/05/24  0751 11/04/24  1348   WBC 10*3/mm3 15.27* 14.71*   HEMOGLOBIN g/dL 14.3 14.8   HEMATOCRIT % 42.0 43.4   PLATELETS 10*3/mm3 345 329     Results from last 7 days   Lab Units 11/05/24  0751 11/04/24  1348   SODIUM mmol/L 137 137   POTASSIUM mmol/L 4.4 4.2   CHLORIDE mmol/L 102 103   CO2 mmol/L 23.0 24.1   BUN mg/dL 20 20   GLUCOSE mg/dL 118* 166*   CREATININE mg/dL 1.06 1.07   CALCIUM mg/dL 9.8 9.7   ALBUMIN g/dL 3.8 3.8       Assessment & Plan   ASSESSMENT      Active Hospital Problems    Diagnosis     **SDH (subdural hematoma)     Headache     Type 2 diabetes mellitus, without long-term current use of insulin     HTN (hypertension)     Coronary atherosclerosis due to lipid rich plaque     Generalized weakness     Subdural hematoma, nontraumatic          PLAN     Acute on chronic subdural hematoma with clinically significant brain compression, s/p gavin holes x 2 on 11/5/24 with Dr. Jenkins: Post-op care per neurosurgery  2. Seizure-like activity: continue Keppra  3. T2DM: SSI, check glucose AC/HS  4. HTN: Continue Losartan.  Jaspreetene PRN for SBP < 180 mmHg.   5. CAD: continue Imdur, statin. ASA/Plavix held due to #1.         I discussed the patient's findings and my recommendations with patient     Plan of care and goals reviewed with multidisciplinary team at daily rounds.    JOSÉ MIGUEL Guzman  Pulmonary and Critical Care Medicine  11/05/24 18:09 EST

## 2024-11-05 NOTE — THERAPY EVALUATION
Patient Name: Manjit Thomas  : 1955    MRN: [id number]                              Today's Date: 2024       Admit Date: 2024    Visit Dx: No diagnosis found.  Patient Active Problem List   Diagnosis    Type 2 diabetes mellitus, without long-term current use of insulin    HLD (hyperlipidemia)    HTN (hypertension)    Coronary atherosclerosis due to lipid rich plaque    Generalized weakness    Subdural hematoma, nontraumatic    Subarachnoid hemorrhage    SDH (subdural hematoma)    Headache     Past Medical History:   Diagnosis Date    Coronary atherosclerosis     Diabetes mellitus     Headache     HLD (hyperlipidemia)     Hypertension     Vitamin B12 deficiency      Past Surgical History:   Procedure Laterality Date    CORONARY ANGIOPLASTY WITH STENT PLACEMENT N/A     EMBOLIZATION CEREBRAL N/A 10/24/2024    Procedure: MMA Emoblization;  Surgeon: Manjit Jenkins MD;  Location: [facility] INVASIVE LOCATION;  Service: Interventional Radiology;  Laterality: N/A;      General Information       Row Name 24          Physical Therapy Time and Intention    Document Type evaluation  -AC     Mode of Treatment physical therapy  -       Row Name 24          General Information    Patient Profile Reviewed yes  -AC     Prior Level of Function independent:;all household mobility;community mobility;gait;transfer;ADL's;bed mobility  -     Existing Precautions/Restrictions fall  -     Barriers to Rehab medically complex  -       Row Name 2432          Living Environment    People in Home spouse  -       Row Name 24          Home Main Entrance    Number of Stairs, Main Entrance two  -AC     Stair Railings, Main Entrance railings safe and in good condition  -       Row Name 2432          Stairs Within Home, Primary    Number of Stairs, Within Home, Primary none  -       Row Name 24          Cognition    Orientation Status  (Cognition) oriented x 4  -AC       Row Name 11/05/24 0932          Safety Issues/Impairments Affecting Functional Mobility    Safety Issues Affecting Function (Mobility) insight into deficits/self-awareness;safety precautions follow-through/compliance;awareness of need for assistance;safety precaution awareness  -AC     Impairments Affecting Function (Mobility) balance;endurance/activity tolerance;strength;coordination  -AC               User Key  (r) = Recorded By, (t) = Taken By, (c) = Cosigned By      Initials Name Provider Type    AC Frida Cruz, PT Physical Therapist                   Mobility       Row Name 11/05/24 0935          Bed Mobility    Bed Mobility supine-sit  -AC     Supine-Sit [place] (Bed Mobility) standby assist  -AC     Assistive Device (Bed Mobility) head of bed elevated  -AC     Comment, (Bed Mobility) Pt transitioned to sitting EOB w/o any physical assistance and no c/o dizziness  -AC       Row Name 11/05/24 0935          Sit-Stand Transfer    Sit-Stand [place] (Transfers) standby assist  -AC     Assistive Device (Sit-Stand Transfers) other (see comments)  no AD  -AC     Comment, (Sit-Stand Transfer) Pt transitioned to standing w/o any physical assistance and no c/o dizziness  -AC       Row Name 11/05/24 0935          Gait/Stairs (Locomotion)    [place] Level (Gait) contact guard;1 person assist  -AC     Distance in Feet (Gait) 150  -AC     Deviations/Abnormal Patterns (Gait) lucio decreased;gait speed decreased;stride length decreased;base of support, narrow  -AC     Bilateral Gait Deviations heel strike decreased  -AC     Comment, (Gait/Stairs) Pt ambulated OOR w/ CGAx1 and no AD. Pt required cues for increased arm swing, increased step length, and forward gaze. In addition pt demonstrated decreased visual scanning, decreased gait speed, and narrow base of support requiring CGAx1 due to impaired walking balance.  -AC               User Key  (r) = Recorded By, (t) = Taken  By, (c) = Cosigned By      Initials Name Provider Type     Frida Cruz PT Physical Therapist                   Obj/Interventions       Row Name 11/05/24 0938          Range of Motion Comprehensive    General Range of Motion bilateral lower extremity ROM WNL  -       Row Name 11/05/24 0938          Strength Comprehensive (MMT)    General Manual Muscle Testing (MMT) Assessment lower extremity strength deficits identified  -     Comment, General Manual Muscle Testing (MMT) Assessment BLE's grossly 4/5  -       Row Name 11/05/24 0938          Motor Skills    Motor Skills coordination;functional endurance  -     Coordination WFL;lower extremity;heel to shin;upper extremity;finger to nose  -     Functional Endurance below baseline  -       Row Name 11/05/24 0938          Balance    Balance Assessment sitting static balance;sitting dynamic balance;standing static balance;standing dynamic balance  -     Static Sitting Balance supervision  -     Dynamic Sitting Balance standby assist  -     Position, Sitting Balance sitting edge of bed;unsupported  -     Static Standing Balance standby assist  -     Dynamic Standing Balance contact guard;1-person assist  -     Position/Device Used, Standing Balance unsupported  -     Balance Interventions sitting;standing;sit to stand;supported;static;dynamic  -       Row Name 11/05/24 0938          Sensory Assessment (Somatosensory)    Sensory Assessment (Somatosensory) LE sensation intact  -               User Key  (r) = Recorded By, (t) = Taken By, (c) = Cosigned By      Initials Name Provider Type     Frida Cruz PT Physical Therapist                   Goals/Plan       Row Name 11/05/24 0943          Bed Mobility Goal 1 (PT)    Activity/Assistive Device (Bed Mobility Goal 1, PT) sit to supine/supine to sit  -     [place] Level/Cues Needed (Bed Mobility Goal 1, PT) independent  -AC     Time Frame (Bed Mobility Goal 1, PT) short term goal (STG);5  days  -AC       Row Name 11/05/24 0943          Transfer Goal 1 (PT)    Activity/Assistive Device (Transfer Goal 1, PT) sit-to-stand/stand-to-sit  -AC     [place] Level/Cues Needed (Transfer Goal 1, PT) independent  -AC     Time Frame (Transfer Goal 1, PT) long term goal (LTG);10 days  -AC       Row Name 11/05/24 0943          Gait Training Goal 1 (PT)    Activity/Assistive Device (Gait Training Goal 1, PT) gait (walking locomotion);improve balance and speed;increase endurance/gait distance;increase energy conservation;decrease fall risk  -AC     [place] Level (Gait Training Goal 1, PT) standby assist  -AC     Distance (Gait Training Goal 1, PT) 200  -AC     Time Frame (Gait Training Goal 1, PT) long term goal (LTG);10 days  -       Row Name 11/05/24 0943          Stairs Goal 1 (PT)    Activity/Assistive Device (Stairs Goal 1, PT) stairs, all skills  -AC     [place] Level/Cues Needed (Stairs Goal 1, PT) standby assist  -AC     Number of Stairs (Stairs Goal 1, PT) 2  -AC     Time Frame (Stairs Goal 1, PT) long term goal (LTG);10 days  -AC       Row Name 11/05/24 0943          Therapy Assessment/Plan (PT)    Planned Therapy Interventions (PT) balance training;bed mobility training;gait training;patient/family education;stair training;strengthening;transfer training  -AC               User Key  (r) = Recorded By, (t) = Taken By, (c) = Cosigned By      Initials Name Provider Type    AC Frida Cruz, PT Physical Therapist                   Clinical Impression       Row Name 11/05/24 0939          Pain    Pretreatment Pain Rating 2/10  -AC     Posttreatment Pain Rating 2/10  -AC     Pain Location head  -AC     Pain Management Interventions exercise or physical activity utilized  -AC     Response to Pain Interventions activity participation with tolerable pain  -AC       Row Name 11/05/24 0939          Plan of Care Review    Plan of Care Reviewed With patient;spouse;family  -AC     Progress no change  -AC      Outcome Evaluation PT initial evaluation complete. Pt presents with decreased activity tolerance, impaired walking balance, and pain this date. Pt ambulated HH distance w/ CGAx1 and no AD however demonstrated decreased visual scanning, narrow base of support, and decreased gait speed. Pt would benefit from continued skilled therapy while hospitalized to [place] functional independence. D/c rec is home w/ assist and home health for best outcome.  -AC       Row Name 11/05/24 0939          Therapy Assessment/Plan (PT)    Rehab Potential (PT) good  -AC     Criteria for Skilled Interventions Met (PT) yes  -AC     Therapy Frequency (PT) daily  -AC     Predicted Duration of Therapy Intervention (PT) 10 days  -AC       Row Name 11/05/24 0939          Vital Signs    Pre Systolic BP Rehab 155  -AC     Pre Treatment Diastolic BP 80  -AC     Post Systolic BP Rehab 143  -AC     Post Treatment Diastolic BP 76  -AC     Pretreatment Heart Rate (beats/min) 47  -AC     Posttreatment Heart Rate (beats/min) 46  -AC     Pre SpO2 (%) 95  -AC     O2 Delivery Pre Treatment room air  -AC     O2 Delivery Intra Treatment room air  -AC     Post SpO2 (%) 93  -AC     O2 Delivery Post Treatment room air  -AC     Pre Patient Position Supine  -AC     Intra Patient Position Standing  -AC     Post Patient Position Sitting  -AC       Row Name 11/05/24 0939          Positioning and Restraints    Pre-Treatment Position in bed  -AC     Post Treatment Position chair  -AC     In Chair notified nsg;reclined;sitting;call light within reach;encouraged to call for assist;exit alarm on;waffle cushion;legs elevated;with family/caregiver  -AC               User Key  (r) = Recorded By, (t) = Taken By, (c) = Cosigned By      Initials Name Provider Type    AC Frida Cruz, PT Physical Therapist                   Outcome Measures       Row Name 11/05/24 0944          How much help from another person do you currently need...    Turning from your back to your side  while in flat bed without using bedrails? 4  -AC     Moving from lying on back to sitting on the side of a flat bed without bedrails? 3  -AC     Moving to and from a bed to a chair (including a wheelchair)? 3  -AC     Standing up from a chair using your arms (e.g., wheelchair, bedside chair)? 3  -AC     Climbing 3-5 steps with a railing? 3  -AC     To walk in hospital room? 3  -AC     AM-PAC 6 Clicks Score (PT) 19  -AC     Highest Level of Mobility Goal 6 --> Walk 10 steps or more  -AC       Row Name 11/05/24 0944          Functional Assessment    Outcome Measure Options AM-PAC 6 Clicks Basic Mobility (PT)  -               User Key  (r) = Recorded By, (t) = Taken By, (c) = Cosigned By      Initials Name Provider Type    AC Frida Cruz, PT Physical Therapist                                 Physical Therapy Education       Title: PT OT SLP Therapies (In Progress)       Topic: Physical Therapy (In Progress)       Point: Mobility training (In Progress)       Learning Progress Summary            Patient Acceptance, E, NR by  at 11/5/2024 0944                      Point: Body mechanics (In Progress)       Learning Progress Summary            Patient Acceptance, E, NR by  at 11/5/2024 0944                      Point: Precautions (In Progress)       Learning Progress Summary            Patient Acceptance, E, NR by  at 11/5/2024 0944                                      User Key       Initials Effective Dates Name Provider Type Discipline     07/11/24 -  Frida Cruz, PT Physical Therapist PT                  PT Recommendation and Plan  Planned Therapy Interventions (PT): balance training, bed mobility training, gait training, patient/family education, stair training, strengthening, transfer training  Progress: no change  Outcome Evaluation: PT initial evaluation complete. Pt presents with decreased activity tolerance, impaired walking balance, and pain this date. Pt ambulated HH distance w/ CGAx1 and no AD however  demonstrated decreased visual scanning, narrow base of support, and decreased gait speed. Pt would benefit from continued skilled therapy while hospitalized to [place] functional independence. D/c rec is home w/ assist and home health for best outcome.     Time Calculation:   PT Evaluation Complexity  History, PT Evaluation Complexity: 1-2 personal factors and/or comorbidities  Examination of Body Systems (PT Eval Complexity): total of 3 or more elements  Clinical Presentation (PT Evaluation Complexity): evolving  Clinical Decision Making (PT Evaluation Complexity): moderate complexity  Overall Complexity (PT Evaluation Complexity): moderate complexity     PT Charges       Row Name 11/05/24 0945             Time Calculation    Start Time 0833  -AC      PT Received On 11/05/24  -AC      PT Goal Re-Cert Due Date 11/15/24  -AC         Untimed Charges    PT Eval/Re-eval Minutes 49  -AC         Total Minutes    Untimed Charges Total Minutes 49  -AC       Total Minutes 49  -AC                User Key  (r) = Recorded By, (t) = Taken By, (c) = Cosigned By      Initials Name Provider Type    AC Frida Cruz, PT Physical Therapist                  Therapy Charges for Today       Code Description Service Date Service Provider Modifiers Qty    [id number] HC PT EVAL MOD COMPLEXITY 4 11/5/2024 Frida Cruz, PT GP 1            PT G-Codes  Outcome Measure Options: AM-PAC 6 Clicks Basic Mobility (PT)  AM-PAC 6 Clicks Score (PT): 19  PT Discharge Summary  Anticipated Discharge Disposition (PT): home with assist, home with home health    Frida Cruz PT  11/5/2024

## 2024-11-05 NOTE — ANESTHESIA POSTPROCEDURE EVALUATION
Patient: Manjit Thomas    Procedure Summary       Date: 11/05/24 Room / Location:  [facility]  [facility]    Anesthesia Start: 1553 Anesthesia Stop: 1704    Procedure: TITA HOLE FOR RIGHT SUBDURAL HEMATOMA (Right: Head) Diagnosis:       Subdural hematoma, nontraumatic      (Subdural hematoma, nontraumatic [I62.00])    Surgeons: Manjit Jenkins MD Provider: Jordy Hodge MD    Anesthesia Type: general ASA Status: 3            Anesthesia Type: general    Vitals  Vitals Value Taken Time   /83 11/05/24 1700   Temp     Pulse 62 11/05/24 1704   Resp     SpO2 97 % 11/05/24 1704   Vitals shown include unfiled device data.        Post Anesthesia Care and Evaluation    Patient location during evaluation: PACU  Patient participation: complete - patient participated  Level of consciousness: awake and alert  Pain score: 2  Pain management: adequate    Airway patency: patent  Anesthetic complications: No anesthetic complications  PONV Status: none  Cardiovascular status: hemodynamically stable and acceptable  Respiratory status: nonlabored ventilation, acceptable and nasal cannula  Hydration status: acceptable

## 2024-11-05 NOTE — ANESTHESIA PREPROCEDURE EVALUATION
Anesthesia Evaluation     Patient summary reviewed and Nursing notes reviewed   NPO Solid Status: > 8 hours  NPO Liquid Status: > 8 hours           Airway   Mallampati: II  TM distance: >3 FB  Neck ROM: full  No difficulty expected  Dental    (+) poor dentition    Pulmonary     breath sounds clear to auscultation  Cardiovascular   Exercise tolerance: good (4-7 METS)    Rhythm: regular  Rate: normal        Neuro/Psych  GI/Hepatic/Renal/Endo      Musculoskeletal     Abdominal    Substance History      OB/GYN          Other                    Anesthesia Plan    ASA 3     general     intravenous induction     Anesthetic plan, risks, benefits, and alternatives have been provided, discussed and informed consent has been obtained with: patient.    CODE STATUS:    Level Of Support Discussed With: Patient  Code Status (Patient has no pulse and is not breathing): CPR (Attempt to Resuscitate)  Medical Interventions (Patient has pulse or is breathing): Full Support

## 2024-11-05 NOTE — PLAN OF CARE
Goal Outcome Evaluation:  Plan of Care Reviewed With: patient, spouse, family        Progress: no change  Outcome Evaluation: PT initial evaluation complete. Pt presents with decreased activity tolerance, impaired walking balance, and pain this date. Pt ambulated HH distance w/ CGAx1 and no AD however demonstrated decreased visual scanning, narrow base of support, and decreased gait speed. Pt would benefit from continued skilled therapy while hospitalized to [place] functional independence. D/c rec is home w/ assist and home health for best outcome.    Anticipated Discharge Disposition (PT): home with assist, home with home health

## 2024-11-05 NOTE — OP NOTE
Preoperative diagnosis: Acute on chronic subdural hematoma, clinically significant brain compression  Postoperative diagnosis: Same    Procedure performed:  1.  Right sided bur hole for evacuation of subacute convexity subdural hematoma    Estimated blood loss: 100 cc    Procedure in detail: The patient was identified in the preoperative holding area and brought to the operating suite where he underwent the uneventful induction of general, endotracheal anesthesia.  The bed was rotated 90 degrees from anesthesia and the patient's head was rotated to the left exposing the right hemicranium.  The skin overlying the right parietal boss was then shaved, prepped, and draped in the usual, sterile fashion.  A timeout was performed.  Intravenous antibiotics were ministered.  2 linear incisions were then made along the anterior and posterior aspects of the parietal boss after anesthetizing the skin.  Hemostasis was achieved using bipolar and monopolar electrocautery.  2 bur holes were placed, 1 anterior 1 posterior.  The dura was coagulated and sharply opened using a #11 blade.  There was dark blood which was then seen to egress from the durotomy under pressure.  The subdural space was copiously irrigated until the effluent started to clear.  I was able to observe the brain underneath which had started to reexpand somewhat.    The galea and skin were then closed in layers.  Glue and a sterile dressing were then applied on both incisions.    Sponge, instrument, and needle counts were all correct at the conclusion of the case.    Priya Lynn, physician assistant was responsible for performing the following activities: Retraction, Suction, Irrigation, Suturing and Closing and their skilled assistance was necessary for the success of this case.

## 2024-11-06 ENCOUNTER — APPOINTMENT (OUTPATIENT)
Dept: CT IMAGING | Facility: HOSPITAL | Age: 69
DRG: 025 | End: 2024-11-06
Payer: MEDICARE

## 2024-11-06 LAB
GLUCOSE BLDC GLUCOMTR-MCNC: 136 MG/DL (ref 70–130)
GLUCOSE BLDC GLUCOMTR-MCNC: 161 MG/DL (ref 70–130)
GLUCOSE BLDC GLUCOMTR-MCNC: 165 MG/DL (ref 70–130)
GLUCOSE BLDC GLUCOMTR-MCNC: 166 MG/DL (ref 70–130)
GLUCOSE BLDC GLUCOMTR-MCNC: 169 MG/DL (ref 70–130)

## 2024-11-06 PROCEDURE — 99024 POSTOP FOLLOW-UP VISIT: CPT

## 2024-11-06 PROCEDURE — 63710000001 INSULIN LISPRO (HUMAN) PER 5 UNITS: Performed by: NEUROLOGICAL SURGERY

## 2024-11-06 PROCEDURE — 70450 CT HEAD/BRAIN W/O DYE: CPT

## 2024-11-06 PROCEDURE — 82948 REAGENT STRIP/BLOOD GLUCOSE: CPT

## 2024-11-06 PROCEDURE — 99232 SBSQ HOSP IP/OBS MODERATE 35: CPT

## 2024-11-06 RX ADMIN — INSULIN LISPRO 2 UNITS: 100 INJECTION, SOLUTION INTRAVENOUS; SUBCUTANEOUS at 20:40

## 2024-11-06 RX ADMIN — MUPIROCIN 1 APPLICATION: 20 OINTMENT TOPICAL at 08:50

## 2024-11-06 RX ADMIN — LOSARTAN POTASSIUM 25 MG: 25 TABLET, FILM COATED ORAL at 08:49

## 2024-11-06 RX ADMIN — CYANOCOBALAMIN TAB 1000 MCG 1000 MCG: 1000 TAB at 08:50

## 2024-11-06 RX ADMIN — INSULIN LISPRO 2 UNITS: 100 INJECTION, SOLUTION INTRAVENOUS; SUBCUTANEOUS at 18:07

## 2024-11-06 RX ADMIN — LEVETIRACETAM 750 MG: 750 TABLET, FILM COATED ORAL at 20:40

## 2024-11-06 RX ADMIN — ATORVASTATIN CALCIUM 80 MG: 40 TABLET, FILM COATED ORAL at 20:40

## 2024-11-06 RX ADMIN — INSULIN LISPRO 2 UNITS: 100 INJECTION, SOLUTION INTRAVENOUS; SUBCUTANEOUS at 12:45

## 2024-11-06 RX ADMIN — MUPIROCIN 1 APPLICATION: 20 OINTMENT TOPICAL at 20:40

## 2024-11-06 RX ADMIN — INSULIN LISPRO 2 UNITS: 100 INJECTION, SOLUTION INTRAVENOUS; SUBCUTANEOUS at 08:50

## 2024-11-06 RX ADMIN — LEVETIRACETAM 750 MG: 750 TABLET, FILM COATED ORAL at 08:49

## 2024-11-06 NOTE — CASE MANAGEMENT/SOCIAL WORK
Continued Stay Note  [facility]     Patient Name: Manjit Thomas  MRN: [id number]  Today's Date: 11/6/2024    Admit Date: 11/4/2024    Plan: Home   Discharge Plan       Row Name 11/06/24 1104       Plan    Plan Home    Plan Comments Spoke with patient and spouse at bedside to discuss disposition and therapy recommendations for HH.  Patient and spouse decline need for home health; discharge plan is home.  Patient denies needs.  CM will continue to follow.                   Discharge Codes    No documentation.                            Sindhu Ponce RN

## 2024-11-06 NOTE — PROGRESS NOTES
Chief complaint: s/p burrholes, SDH    Admit Diagnosis:   SDH (subdural hematoma) [S06.5XAA]     Subjective: No events overnight.     Objective:    Vitals:    24 0900   BP: 152/85   Pulse: 57   Resp:    Temp:    SpO2: 94%     Pulse  Av.2  Min: 45  Max: 72  Systolic (24hrs), Av , Min:119 , Max:195     Diastolic (24hrs), Av, Min:56, Max:107    Temp (24hrs), Av °F (36.7 °C), Min:97.8 °F (36.6 °C), Max:98.2 °F (36.8 °C)      He is awake, alert, pleasant and conversant. Able to move all 4 extremities.     Lab Results   Component Value Date     2024       A/P:   Admit Diagnosis:   SDH (subdural hematoma) [S06.5XAA]     CT scan reviewed and is stable. Patient reports good relief of his symptoms. Would like to possibly move him to the floor and start PT/OT. Most likely DC home tomorrow.

## 2024-11-06 NOTE — PROGRESS NOTES
Intensive Care Follow-up     Hospital:  LOS: 2 days   Mr. Manjit Thomas, 69 y.o. male is followed for: Glycemic, Electrolyte, Respiratory, and Medical management         Subjective   Interval History:  Alert and oriented, pleasant and conversant, sitting up in the bed. Hemodynamically changes. No acute events overnight. Denies shortness of breath, pain, nausea/vomiting, abdominal discomfort. Is ready to work with PT/OT and go home. Family at bedside. All questions answered.      The patient's past medical, surgical and social history were reviewed and updated in Epic as appropriate.     Objective     Infusions:  lactated ringers, 9 mL/hr, Last Rate: Stopped (11/05/24 2309)  niCARdipine, 5-15 mg/hr, Last Rate: Stopped (11/05/24 1825)    Medications:  atorvastatin, 80 mg, Oral, Nightly  folic acid, 1 mg, Oral, Daily  insulin lispro, 2-7 Units, Subcutaneous, 4x Daily AC & at Bedtime  isosorbide mononitrate, 30 mg, Oral, Daily  levETIRAcetam, 750 mg, Oral, BID  losartan, 25 mg, Oral, Q24H  mupirocin, 1 Application, Each Nare, BID  vitamin B-12, 1,000 mcg, Oral, Daily    I reviewed the patient's medications.    Vital Sign Min/Max for last 24 hours  Temp  Min: 97.8 °F (36.6 °C)  Max: 98.2 °F (36.8 °C)   BP  Min: 119/56  Max: 195/83   Pulse  Min: 45  Max: 72   Resp  Min: 13  Max: 20   SpO2  Min: 91 %  Max: 98 %   Flow (L/min) (Oxygen Therapy)  Min: 2  Max: 2       Input/Output for last 24 hour shift  11/05 0701 - 11/06 0700  In: 855 [P.O.:500; I.V.:255]  Out: 1300 [Urine:1300]   S RR:  [4-10] 4    Physical Exam:  GENERAL: Patient lying in bed and conversant. No acute distress.   HEENT: Normocephalic and atraumatic. Trachea midline. PER. EOM WNL. Right head incision C/D/I.    LUNGS: Chest rise of normal depth and symmetric. Lungs clear to auscultation bilaterally. No wheezes, rhonchi, or rales.   HEART: S1,S2 detected. Regular rate and rhythm. No rub, murmur, or gallop.   ABDOMEN: Soft, round, nondistended, and nontender.  Bowel sounds present.   EXTREMITIES: No clubbing, edema, or cyanosis. Peripheral pulses present. Skin warm and dry.    NEURO/PSYCH: Alert and oriented. Follows commands. Moves all extremities. No focal deficits.      Results from last 7 days   Lab Units 11/05/24  0751 11/04/24  1348   WBC 10*3/mm3 15.27* 14.71*   HEMOGLOBIN g/dL 14.3 14.8   PLATELETS 10*3/mm3 345 329     Results from last 7 days   Lab Units 11/05/24  0751 11/04/24  1348   SODIUM mmol/L 137 137   POTASSIUM mmol/L 4.4 4.2   CO2 mmol/L 23.0 24.1   BUN mg/dL 20 20   CREATININE mg/dL 1.06 1.07   GLUCOSE mg/dL 118* 166*     Estimated Creatinine Clearance: 79.3 mL/min (by C-G formula based on SCr of 1.06 mg/dL).      I reviewed the patient's new clinical results.  I reviewed the patient's new imaging results/reports including actual images and agree with reports.     Imaging Results (Last 24 Hours)       Procedure Component Value Units Date/Time    CT Head Without Contrast [[id number]] Collected: 11/06/24 0805     Updated: 11/06/24 0817    Narrative:      CT HEAD WO CONTRAST    Date of Exam: 11/6/2024 4:53 AM EST    Indication: SDH, s/p crani.    Comparison: 11/5/2024.    Technique: Axial CT images were obtained of the head without contrast administration.  Automated exposure control and iterative construction methods were used.      Findings:  2 right gavin holes are again identified. Right subdural hematoma measuring up to 1.5 cm in transverse dimension is stable. Scattered areas again noted related to postoperative changes. There is a small stable left subdural fluid collection without acute   hemorrhage. Edema is again seen throughout the right cerebral hemisphere with effacement of sulci. There is partial effacement of the right lateral ventricle. There is approximate 1 cm of leftward midline shift which is stable.      Impression:      Impression:  Heterogeneous subdural hemorrhage on the right is stable in size. Scattered air is again noted related to  postoperative changes.    Stable small left subdural fluid collection.    Edema throughout the right cerebral hemisphere is stable with stable leftward midline shift. No new abnormality.        Electronically Signed: Christina Montilla MD    11/6/2024 8:14 AM EST    Workstation ID: [id number]    CT Head Without Contrast [[id number]] Collected: 11/05/24 1805     Updated: 11/05/24 1814    Narrative:      CT HEAD WO CONTRAST    Date of Exam: 11/5/2024 5:49 PM EST    Indication: SDH.    Comparison: 11/4/2024    Technique: Axial CT images were obtained of the head without contrast administration.  Automated exposure control and iterative construction methods were used.      Findings:  Postprocedural changes are noted status post right gavin hole placement. Residual heterogeneous subdural hemorrhage is noted measuring 1.5 cm in transverse dimension. Scattered air is noted related to postoperative changes.    A small left subdural fluid collection is seen measuring approximately 6 mm. This finding appears stable.    Residual edema is seen throughout the right cerebral hemisphere. There is effacement of the sulci throughout the right cerebral hemisphere. There is partial effacement of the right lateral ventricle. There is a leftward midline shift of approximately 9   mm. The midline shift has improved. There is partial effacement of the right basilar cisterns. There is mild right cerebellar tonsillar ectopia at the foramen magnum.      Impression:      Impression:  1.Interval postprocedural changes status post right gavin hole placement.  2.Residual heterogeneous subdural hemorrhage is noted on the right measuring up to 1.5 cm in transverse dimension. Scattered air is noted related to postoperative changes.  3.Stable small left subdural fluid collection.  4.Residual edema throughout the right cerebral hemisphere. Moderate associated mass effect is noted. There is a leftward midline shift of approximately 9 mm. The midline shift has  improved. There is also partial effacement of the right basilar cisterns   and mild right cerebellar tonsillar ectopia at the foramen magnum.        Electronically Signed: Manny Parry MD    11/5/2024 6:11 PM EST    Workstation ID: [id number]          Assessment & Plan   Impression      SDH (subdural hematoma)    Type 2 diabetes mellitus, without long-term current use of insulin    HTN (hypertension)    Coronary atherosclerosis due to lipid rich plaque    Generalized weakness    Subdural hematoma, nontraumatic    Headache       Plan        Manjit Thomas is a 69 y.o. male with PMH T2DM, HTN, HLD, CAD s/p JESSE x2 (2019), and recent SDH who was admitted to [facility] on 10/23/24 and underwent MMA embolization on 10/24/24. He developed bradycardia and seizure-like activity and was started on Keppra. He was discharged home on 10/27/24.    He was seen by his PCP for increasing headaches x 1 week with associated anorexia and nausea. He was sent to [facility] ED for evaluation, where CTH showed an acute on chronic SDH with 1-cm midline shift. He was transferred to [facility] and was taken to OR on 11/05/24 for gavin holes per Neurosurgery. Post-operatively, he was transferred to the ICU for higher level of care.     Acute on chronic SDH with clinically significant brain compression s/p gavin holes x 2 per Dr. Jenkins on 11/05/24. Post-operative care per Neurosurgery. CTH 11/06/24 stable.  Seizure-like activity: Continue Keppra  T2DM: AccuChecks ACHS, CC diabetic diet. Titrate SSI  HTN: Continue Losartan. Restart home antihypertensives as able.  CAD: Continue Imdur, statin. ASA/Plavix currently on hold. Restart at Neurosurgery's timing.  PT/OT  Replace electrolytes per protocol  AM labs    DVT Prophylaxis: Mechanical  GI Prophylaxis: N/A  Dispo: Transfer to telemetry. Likely home tomorrow.    Critical Care time spent in direct patient care: 32 minutes (excluding procedure time, if applicable) including high complexity decision making to  assess, manipulate, and support vital organ system failure in this individual who has impairment of one or more vital organ systems such that there is a high probability of imminent or life threatening deterioration in the patient's condition.     Sanjana Ramirez, MSN, APRN, ACN-AG  Pulmonary and Critical Care Medicine  Electronically signed by JOSÉ MIGUEL Fox, 11/06/24, 12:44 PM EST.

## 2024-11-07 ENCOUNTER — APPOINTMENT (OUTPATIENT)
Dept: CT IMAGING | Facility: HOSPITAL | Age: 69
DRG: 025 | End: 2024-11-07
Payer: MEDICARE

## 2024-11-07 LAB
ALBUMIN SERPL-MCNC: 3.7 G/DL (ref 3.5–5.2)
ALBUMIN/GLOB SERPL: 1.3 G/DL
ALP SERPL-CCNC: 83 U/L (ref 39–117)
ALT SERPL W P-5'-P-CCNC: 22 U/L (ref 1–41)
ANION GAP SERPL CALCULATED.3IONS-SCNC: 12 MMOL/L (ref 5–15)
AST SERPL-CCNC: 19 U/L (ref 1–40)
BASOPHILS # BLD AUTO: 0.06 10*3/MM3 (ref 0–0.2)
BASOPHILS NFR BLD AUTO: 0.4 % (ref 0–1.5)
BILIRUB SERPL-MCNC: 0.4 MG/DL (ref 0–1.2)
BUN SERPL-MCNC: 19 MG/DL (ref 8–23)
BUN/CREAT SERPL: 17.8 (ref 7–25)
CALCIUM SPEC-SCNC: 9.3 MG/DL (ref 8.6–10.5)
CHLORIDE SERPL-SCNC: 104 MMOL/L (ref 98–107)
CO2 SERPL-SCNC: 23 MMOL/L (ref 22–29)
CREAT SERPL-MCNC: 1.07 MG/DL (ref 0.76–1.27)
DEPRECATED RDW RBC AUTO: 42 FL (ref 37–54)
EGFRCR SERPLBLD CKD-EPI 2021: 75.1 ML/MIN/1.73
EOSINOPHIL # BLD AUTO: 0.11 10*3/MM3 (ref 0–0.4)
EOSINOPHIL NFR BLD AUTO: 0.8 % (ref 0.3–6.2)
ERYTHROCYTE [DISTWIDTH] IN BLOOD BY AUTOMATED COUNT: 13.3 % (ref 12.3–15.4)
GLOBULIN UR ELPH-MCNC: 2.8 GM/DL
GLUCOSE BLDC GLUCOMTR-MCNC: 113 MG/DL (ref 70–130)
GLUCOSE BLDC GLUCOMTR-MCNC: 113 MG/DL (ref 70–130)
GLUCOSE BLDC GLUCOMTR-MCNC: 178 MG/DL (ref 70–130)
GLUCOSE BLDC GLUCOMTR-MCNC: 194 MG/DL (ref 70–130)
GLUCOSE BLDC GLUCOMTR-MCNC: 98 MG/DL (ref 70–130)
GLUCOSE SERPL-MCNC: 110 MG/DL (ref 65–99)
HCT VFR BLD AUTO: 41.9 % (ref 37.5–51)
HGB BLD-MCNC: 14.4 G/DL (ref 13–17.7)
IMM GRANULOCYTES # BLD AUTO: 0.08 10*3/MM3 (ref 0–0.05)
IMM GRANULOCYTES NFR BLD AUTO: 0.6 % (ref 0–0.5)
LYMPHOCYTES # BLD AUTO: 2.24 10*3/MM3 (ref 0.7–3.1)
LYMPHOCYTES NFR BLD AUTO: 16.1 % (ref 19.6–45.3)
MAGNESIUM SERPL-MCNC: 1.9 MG/DL (ref 1.6–2.4)
MCH RBC QN AUTO: 29.2 PG (ref 26.6–33)
MCHC RBC AUTO-ENTMCNC: 34.4 G/DL (ref 31.5–35.7)
MCV RBC AUTO: 85 FL (ref 79–97)
MONOCYTES # BLD AUTO: 1.43 10*3/MM3 (ref 0.1–0.9)
MONOCYTES NFR BLD AUTO: 10.3 % (ref 5–12)
NEUTROPHILS NFR BLD AUTO: 10 10*3/MM3 (ref 1.7–7)
NEUTROPHILS NFR BLD AUTO: 71.8 % (ref 42.7–76)
NRBC BLD AUTO-RTO: 0 /100 WBC (ref 0–0.2)
PHOSPHATE SERPL-MCNC: 3.7 MG/DL (ref 2.5–4.5)
PLATELET # BLD AUTO: 337 10*3/MM3 (ref 140–450)
PMV BLD AUTO: 10.4 FL (ref 6–12)
POTASSIUM SERPL-SCNC: 3.9 MMOL/L (ref 3.5–5.2)
PROT SERPL-MCNC: 6.5 G/DL (ref 6–8.5)
RBC # BLD AUTO: 4.93 10*6/MM3 (ref 4.14–5.8)
SODIUM SERPL-SCNC: 139 MMOL/L (ref 136–145)
WBC NRBC COR # BLD AUTO: 13.92 10*3/MM3 (ref 3.4–10.8)

## 2024-11-07 PROCEDURE — 63710000001 INSULIN LISPRO (HUMAN) PER 5 UNITS: Performed by: NEUROLOGICAL SURGERY

## 2024-11-07 PROCEDURE — 99232 SBSQ HOSP IP/OBS MODERATE 35: CPT

## 2024-11-07 PROCEDURE — 63710000001 DEXAMETHASONE PER 0.25 MG

## 2024-11-07 PROCEDURE — 82948 REAGENT STRIP/BLOOD GLUCOSE: CPT

## 2024-11-07 PROCEDURE — 83735 ASSAY OF MAGNESIUM: CPT

## 2024-11-07 PROCEDURE — 85025 COMPLETE CBC W/AUTO DIFF WBC: CPT

## 2024-11-07 PROCEDURE — 84100 ASSAY OF PHOSPHORUS: CPT

## 2024-11-07 PROCEDURE — 99024 POSTOP FOLLOW-UP VISIT: CPT | Performed by: NEUROLOGICAL SURGERY

## 2024-11-07 PROCEDURE — 97164 PT RE-EVAL EST PLAN CARE: CPT

## 2024-11-07 PROCEDURE — 80053 COMPREHEN METABOLIC PANEL: CPT

## 2024-11-07 PROCEDURE — 97116 GAIT TRAINING THERAPY: CPT

## 2024-11-07 PROCEDURE — 70450 CT HEAD/BRAIN W/O DYE: CPT

## 2024-11-07 RX ORDER — DEXAMETHASONE 4 MG/1
4 TABLET ORAL EVERY 8 HOURS SCHEDULED
Status: DISCONTINUED | OUTPATIENT
Start: 2024-11-07 | End: 2024-11-08 | Stop reason: HOSPADM

## 2024-11-07 RX ADMIN — ISOSORBIDE MONONITRATE 30 MG: 30 TABLET, EXTENDED RELEASE ORAL at 10:34

## 2024-11-07 RX ADMIN — DEXAMETHASONE 4 MG: 4 TABLET ORAL at 14:46

## 2024-11-07 RX ADMIN — LEVETIRACETAM 750 MG: 750 TABLET, FILM COATED ORAL at 10:35

## 2024-11-07 RX ADMIN — MUPIROCIN 1 APPLICATION: 20 OINTMENT TOPICAL at 20:40

## 2024-11-07 RX ADMIN — FOLIC ACID 1 MG: 1 TABLET ORAL at 10:35

## 2024-11-07 RX ADMIN — DEXAMETHASONE 4 MG: 4 TABLET ORAL at 20:40

## 2024-11-07 RX ADMIN — INSULIN LISPRO 2 UNITS: 100 INJECTION, SOLUTION INTRAVENOUS; SUBCUTANEOUS at 20:39

## 2024-11-07 RX ADMIN — INSULIN LISPRO 2 UNITS: 100 INJECTION, SOLUTION INTRAVENOUS; SUBCUTANEOUS at 12:11

## 2024-11-07 RX ADMIN — LOSARTAN POTASSIUM 25 MG: 25 TABLET, FILM COATED ORAL at 06:54

## 2024-11-07 RX ADMIN — MUPIROCIN 1 APPLICATION: 20 OINTMENT TOPICAL at 10:35

## 2024-11-07 RX ADMIN — CYANOCOBALAMIN TAB 1000 MCG 1000 MCG: 1000 TAB at 10:34

## 2024-11-07 RX ADMIN — ATORVASTATIN CALCIUM 80 MG: 40 TABLET, FILM COATED ORAL at 20:39

## 2024-11-07 RX ADMIN — LEVETIRACETAM 750 MG: 750 TABLET, FILM COATED ORAL at 20:39

## 2024-11-07 NOTE — PLAN OF CARE
I personally examined the patient this afternoon. He is drowsy and somnolent. He appears to be extremely exhausted. The wife and RN report that he did stay up all night the other night watching the election and only slept about 4 hours last night. He is sitting up in the recliner and is dozing off to sleep during my examination but he is responsive to voice and painful stimulus. He is able to tell me his name, , what hospital he is at but is somewhat slow to respond. He denies headache. He tells me that he is tired. I would like to loosen his parameters for neurochecks tonight to allow him to get some rest. However, out of an abundance of caution, I would like to make him NPO. We will reassess his neurostatus in the morning. I have made Dr. Jenkins aware.     Priya Lynn PA-C

## 2024-11-07 NOTE — THERAPY RE-EVALUATION
Patient Name: Manjit Thomas  : 1955    MRN: [id number]                              Today's Date: 2024       Admit Date: 2024    Visit Dx:     ICD-10-CM ICD-9-CM   1. SDH (subdural hematoma)  S06.5XAA 432.1   2. Cognitive communication disorder  R41.841 315.32     Patient Active Problem List   Diagnosis    Type 2 diabetes mellitus, without long-term current use of insulin    HLD (hyperlipidemia)    HTN (hypertension)    Coronary atherosclerosis due to lipid rich plaque    Generalized weakness    Subdural hematoma, nontraumatic    Subarachnoid hemorrhage    SDH (subdural hematoma)    Headache     Past Medical History:   Diagnosis Date    Coronary atherosclerosis     Diabetes mellitus     Headache     HLD (hyperlipidemia)     Hypertension     Sinus bradycardia     Vitamin B12 deficiency      Past Surgical History:   Procedure Laterality Date    TITA HOLE Right 2024    Procedure: TITA HOLE FOR RIGHT SUBDURAL HEMATOMA;  Surgeon: Manjit Jenkins MD;  Location:  [facility];  Service: Neurosurgery;  Laterality: Right;    CORONARY ANGIOPLASTY WITH STENT PLACEMENT N/A     EMBOLIZATION CEREBRAL N/A 10/24/2024    Procedure: MMA Emoblization;  Surgeon: Manjit Jenkins MD;  Location:  [facility] CATH INVASIVE LOCATION;  Service: Interventional Radiology;  Laterality: N/A;      General Information       Row Name 24 1012          Physical Therapy Time and Intention    Document Type re-evaluation  -ES     Mode of Treatment physical therapy  -ES       Row Name 24 1012          General Information    Patient Profile Reviewed yes  -ES     Prior Level of Function --  see IE  -ES     Existing Precautions/Restrictions fall  -ES     Barriers to Rehab medically complex  -ES       Row Name 24 1012          Living Environment    People in Home --  see IE  -ES       Row Name 24 1012          Cognition    Orientation Status (Cognition) oriented x 4  -ES       Row Name 24 1012           Safety Issues/Impairments Affecting Functional Mobility    Safety Issues Affecting Function (Mobility) awareness of need for assistance;insight into deficits/self-awareness;safety precautions follow-through/compliance;safety precaution awareness  -ES     Impairments Affecting Function (Mobility) balance;endurance/activity tolerance;strength;coordination  -ES               User Key  (r) = Recorded By, (t) = Taken By, (c) = Cosigned By      Initials Name Provider Type    ES Allison Lacey PT Physical Therapist                   Mobility       Row Name 11/07/24 1013          Bed Mobility    Comment, (Bed Mobility) Received standing in bathroom  -ES       Row Name 11/07/24 1013          Gait/Stairs (Locomotion)    [place] Level (Gait) contact guard;1 person assist  -ES     Assistive Device (Gait) other (see comments)  BUE support  -ES     Patient was able to Ambulate yes  -ES     Distance in Feet (Gait) 100  -ES     Deviations/Abnormal Patterns (Gait) lucio decreased;gait speed decreased;stride length decreased;base of support, narrow;bilateral deviations  -ES     Bilateral Gait Deviations forward flexed posture  -ES     Comment, (Gait/Stairs) Pt amb with CGA and BUE support. Demo'd forward flexed posture with decreased stride length and short, shuffled steps. Required v/c for sequencing but denied dizziness/LH/headache with mobility.  -ES               User Key  (r) = Recorded By, (t) = Taken By, (c) = Cosigned By      Initials Name Provider Type    Allison Olmos PT Physical Therapist                   Obj/Interventions       Row Name 11/07/24 1014          Range of Motion Comprehensive    General Range of Motion bilateral lower extremity ROM WFL  -ES       Row Name 11/07/24 1014          Strength Comprehensive (MMT)    General Manual Muscle Testing (MMT) Assessment lower extremity strength deficits identified  -ES     Comment, General Manual Muscle Testing (MMT) Assessment BLE grossly 4/5. No  asymmetry noted  -ES       Row Name 11/07/24 1014          Balance    Balance Assessment sitting static balance;sitting dynamic balance;standing static balance;standing dynamic balance  -ES     Static Sitting Balance supervision  -ES     Dynamic Sitting Balance standby assist  -ES     Position, Sitting Balance unsupported;sitting edge of bed  -ES     Static Standing Balance contact guard  -ES     Dynamic Standing Balance contact guard;verbal cues  -ES     Position/Device Used, Standing Balance supported  -ES     Balance Interventions sitting;standing;sit to stand;supported;static;dynamic;occupation based/functional task  -ES               User Key  (r) = Recorded By, (t) = Taken By, (c) = Cosigned By      Initials Name Provider Type    ES Allison Lacey, PT Physical Therapist                   Goals/Plan       Row Name 11/07/24 1018          Bed Mobility Goal 1 (PT)    Activity/Assistive Device (Bed Mobility Goal 1, PT) sit to supine/supine to sit  -ES     [place] Level/Cues Needed (Bed Mobility Goal 1, PT) independent  -ES     Time Frame (Bed Mobility Goal 1, PT) short term goal (STG);5 days  -ES     Progress/Outcomes (Bed Mobility Goal 1, PT) goal ongoing  -ES       Row Name 11/07/24 1018          Transfer Goal 1 (PT)    Activity/Assistive Device (Transfer Goal 1, PT) sit-to-stand/stand-to-sit  -ES     [place] Level/Cues Needed (Transfer Goal 1, PT) independent  -ES     Time Frame (Transfer Goal 1, PT) long term goal (LTG);10 days  -ES     Progress/Outcome (Transfer Goal 1, PT) goal ongoing  -ES       Row Name 11/07/24 1018          Gait Training Goal 1 (PT)    Activity/Assistive Device (Gait Training Goal 1, PT) gait (walking locomotion);improve balance and speed;increase endurance/gait distance;increase energy conservation;decrease fall risk  -ES     [place] Level (Gait Training Goal 1, PT) supervision required  -ES     Distance (Gait Training Goal 1, PT) 200  -ES     Time Frame (Gait Training  Goal 1, PT) long term goal (LTG);10 days  -ES     Progress/Outcome (Gait Training Goal 1, PT) goal revised this date;goal ongoing  -ES       Row Name 11/07/24 1018          Stairs Goal 1 (PT)    Activity/Assistive Device (Stairs Goal 1, PT) stairs, all skills  -ES     [place] Level/Cues Needed (Stairs Goal 1, PT) standby assist  -ES     Number of Stairs (Stairs Goal 1, PT) 2  -ES     Time Frame (Stairs Goal 1, PT) long term goal (LTG);10 days  -ES     Progress/Outcome (Stairs Goal 1, PT) goal ongoing  -ES               User Key  (r) = Recorded By, (t) = Taken By, (c) = Cosigned By      Initials Name Provider Type    ES Allison Lacey, PT Physical Therapist                   Clinical Impression       Row Name 11/07/24 1015          Pain    Pretreatment Pain Rating 0/10 - no pain  -ES     Posttreatment Pain Rating 0/10 - no pain  -ES     Pre/Posttreatment Pain Comment denied pain throughout session  -ES       Row Name 11/07/24 1015          Plan of Care Review    Plan of Care Reviewed With patient  -ES     Progress no change  -ES     Outcome Evaluation PT re-eval complete with goals adjusted appropriately. Pt presents with generalized weakness, balance deficits, and decreased activity tolerance warranting skilled IPPT services. Pt denied dizziness/increased pain with mobility, but required frequent prompting to answer questions/participate. RN notified of increased drowsiness and present at end of session. PT rec home w/ assist and HHPT at d/c.  -ES       Row Name 11/07/24 1015          Therapy Assessment/Plan (PT)    Rehab Potential (PT) good  -ES     Criteria for Skilled Interventions Met (PT) yes;meets criteria;skilled treatment is necessary  -ES     Therapy Frequency (PT) daily  -ES     Predicted Duration of Therapy Intervention (PT) 10 days  -ES       Row Name 11/07/24 1015          Vital Signs    Pre Systolic BP Rehab --  in bathroom off tele  -ES     Post Systolic BP Rehab 152  -ES     Post Treatment  Diastolic BP 71  -ES     Posttreatment Heart Rate (beats/min) 50  -ES     O2 Delivery Pre Treatment room air  -ES     O2 Delivery Intra Treatment room air  -ES     Post SpO2 (%) 92  -ES     O2 Delivery Post Treatment room air  -ES     Pre Patient Position Standing  -ES     Intra Patient Position Standing  -ES     Post Patient Position Supine  -ES       Row Name 11/07/24 1015          Positioning and Restraints    Pre-Treatment Position bathroom  -ES     Post Treatment Position bed  -ES     In Bed supine;call light within reach;encouraged to call for assist;exit alarm on;with family/caregiver;side rails up x2;with nsg  -ES               User Key  (r) = Recorded By, (t) = Taken By, (c) = Cosigned By      Initials Name Provider Type    Allison Olmos PT Physical Therapist                   Outcome Measures       Row Name 11/07/24 1019          How much help from another person do you currently need...    Turning from your back to your side while in flat bed without using bedrails? 3  -ES     Moving from lying on back to sitting on the side of a flat bed without bedrails? 3  -ES     Moving to and from a bed to a chair (including a wheelchair)? 3  -ES     Standing up from a chair using your arms (e.g., wheelchair, bedside chair)? 3  -ES     Climbing 3-5 steps with a railing? 3  -ES     To walk in hospital room? 3  -ES     AM-PAC 6 Clicks Score (PT) 18  -ES     Highest Level of Mobility Goal 6 --> Walk 10 steps or more  -ES       Row Name 11/07/24 1019          Functional Assessment    Outcome Measure Options AM-PAC 6 Clicks Basic Mobility (PT)  -ES               User Key  (r) = Recorded By, (t) = Taken By, (c) = Cosigned By      Initials Name Provider Type    Allison Olmos PT Physical Therapist                                 Physical Therapy Education       Title: PT OT SLP Therapies (In Progress)       Topic: Physical Therapy (In Progress)       Point: Mobility training (In Progress)       Learning  Progress Summary            Patient Acceptance, E,TB, NR by ES at 11/7/2024 1021    Acceptance, E, NR by AC at 11/5/2024 0944   Family Acceptance, E,TB, NR by ES at 11/7/2024 1021                      Point: Home exercise program (Not Started)       Learner Progress:  Not documented in this visit.              Point: Body mechanics (In Progress)       Learning Progress Summary            Patient Acceptance, E,TB, NR by ES at 11/7/2024 1021    Acceptance, E, NR by AC at 11/5/2024 0944   Family Acceptance, E,TB, NR by ES at 11/7/2024 1021                      Point: Precautions (In Progress)       Learning Progress Summary            Patient Acceptance, E,TB, NR by ES at 11/7/2024 1021    Acceptance, E, NR by AC at 11/5/2024 0944   Family Acceptance, E,TB, NR by ES at 11/7/2024 1021                                      User Key       Initials Effective Dates Name Provider Type Discipline     08/11/22 -  Allison Lacey, PT Physical Therapist PT     07/11/24 -  Frida Cruz PT Physical Therapist PT                  PT Recommendation and Plan     Progress: no change  Outcome Evaluation: PT re-eval complete with goals adjusted appropriately. Pt presents with generalized weakness, balance deficits, and decreased activity tolerance warranting skilled IPPT services. Pt denied dizziness/increased pain with mobility, but required frequent prompting to answer questions/participate. RN notified of increased drowsiness and present at end of session. PT rec home w/ assist and HHPT at d/c.     Time Calculation:         PT Charges       Row Name 11/07/24 1021             Time Calculation    Start Time 0918  -ES      PT Received On 11/07/24  -ES      PT Goal Re-Cert Due Date 11/17/24  -ES         Time Calculation- PT    Total Timed Code Minutes- PT 16 minute(s)  -ES         Timed Charges    31652 - Gait Training Minutes  16  -ES         Untimed Charges    PT Eval/Re-eval Minutes 23  -ES         Total Minutes    Timed Charges  Total Minutes 16  -ES      Untimed Charges Total Minutes 23  -ES       Total Minutes 39  -ES                User Key  (r) = Recorded By, (t) = Taken By, (c) = Cosigned By      Initials Name Provider Type    Allison Olmso, YAYA Physical Therapist                  Therapy Charges for Today       Code Description Service Date Service Provider Modifiers Qty    [id number]  GAIT TRAINING EA 15 MIN 11/7/2024 Allison Lacey, PT GP 1    [id number]  PT RE-EVAL ESTABLISHED PLAN 2 11/7/2024 Allison Lacey, PT GP 1            PT G-Codes  Outcome Measure Options: AM-PAC 6 Clicks Basic Mobility (PT)  AM-PAC 6 Clicks Score (PT): 18  PT Discharge Summary  Anticipated Discharge Disposition (PT): home with assist, home with home health    Allison Lacey, YAYA  11/7/2024

## 2024-11-07 NOTE — PROGRESS NOTES
Chief complaint: Subdural hematoma    Admit Diagnosis:   SDH (subdural hematoma) [S06.5XAA]     Subjective: No events overnight.  Patient is somnolent but arousable.    Objective:    Vitals:    24 1000   BP: 152/71   Pulse: 54   Resp:    Temp:    SpO2: 93%     Pulse  Av.2  Min: 52  Max: 68  Systolic (24hrs), Av , Min:129 , Max:194     Diastolic (24hrs), Av, Min:71, Max:120    Temp (24hrs), Av.2 °F (36.8 °C), Min:97.8 °F (36.6 °C), Max:98.5 °F (36.9 °C)      He moves all 4 extremities to command.  He is somewhat less talkative than he has been in the past, however he just reports that he is feeling tired.  He denies headache.    Lab Results   Component Value Date     2024     I reviewed his most recent head CT which was performed this morning at the request of the nursing staff.  Overall, I would say his midline shift is stable versus slightly improved.  Certainly, there is no obvious radiographic evidence of worsening.    A/P:   Admit Diagnosis:   SDH (subdural hematoma) [S06.5XAA]     Would like for him to have a PT OT eval.  If there is any concern about him having progressive or worsening neurological deficits, we certainly can watch him for another day.  I would slowly taper his Decadron, and I would like to follow-up with him in 2 weeks assuming ends up going home today or tomorrow.  I did speak with the patient's wife is at the bedside.

## 2024-11-07 NOTE — PLAN OF CARE
Problem: Adult Inpatient Plan of Care  Goal: Optimal Comfort and Wellbeing  Outcome: Progressing  Intervention: Provide Person-Centered Care  Recent Flowsheet Documentation  Taken 11/6/2024 2000 by Wendy Sow RN  Trust Relationship/Rapport:   care explained   choices provided   thoughts/feelings acknowledged  Goal: Readiness for Transition of Care  Outcome: Progressing   Goal Outcome Evaluation:      No acute events overnight, a/ox4 except at 0400 assessment when he was confused to time. Complaint of a headache but rated it 1/10 scale, elevated BP above 160 systolic; contacted APRN. When rechecked it was lower without intervention. Up to BR with standby assistance.

## 2024-11-07 NOTE — PLAN OF CARE
RN reached out and states that patient had change in neurostatus. Dr. Jenkins did just seen patient and patient states that he was tired and drowsy. Ambulate patient and continue with PT. He may need to remain in the hospital another day or so. Pending that his new head CT remains stable, he can be discharged when medically ready. He will need to follow up with neurosurgery in 2 weeks for a postoperative incision check.     Priya Lynn PA-C

## 2024-11-07 NOTE — PLAN OF CARE
Goal Outcome Evaluation:  Plan of Care Reviewed With: patient        Progress: no change  Outcome Evaluation: PT re-eval complete with goals adjusted appropriately. Pt presents with generalized weakness, balance deficits, and decreased activity tolerance warranting skilled IPPT services. Pt denied dizziness/increased pain with mobility, but required frequent prompting to answer questions/participate. RN notified of increased drowsiness and present at end of session. PT rec home w/ assist and HHPT at d/c.    Anticipated Discharge Disposition (PT): home with assist, home with home health

## 2024-11-07 NOTE — PROGRESS NOTES
Intensive Care Follow-up     Hospital:  LOS: 3 days   Mr. Manjit Thomas, 69 y.o. male is followed for: Glycemic, Electrolyte, Respiratory, and Medical management         Subjective   Interval History:  Alert and oriented, pleasant and conversant, sitting up in the bed. Hemodynamically changes. RN reported change in neuro status this morning. STAT CTH obtained and stable. Neuro status improved rapidly and it was felt to be secondary to being tired. Denies shortness of breath, pain, nausea/vomiting, abdominal discomfort.  Family at bedside. All questions answered.      The patient's past medical, surgical and social history were reviewed and updated in Epic as appropriate.     Objective     Infusions:  niCARdipine, 5-15 mg/hr, Last Rate: Stopped (11/05/24 1825)    Medications:  atorvastatin, 80 mg, Oral, Nightly  folic acid, 1 mg, Oral, Daily  insulin lispro, 2-7 Units, Subcutaneous, 4x Daily AC & at Bedtime  isosorbide mononitrate, 30 mg, Oral, Daily  levETIRAcetam, 750 mg, Oral, BID  losartan, 25 mg, Oral, Q24H  mupirocin, 1 Application, Each Nare, BID  vitamin B-12, 1,000 mcg, Oral, Daily    I reviewed the patient's medications.    Vital Sign Min/Max for last 24 hours  Temp  Min: 97.8 °F (36.6 °C)  Max: 98.5 °F (36.9 °C)   BP  Min: 129/73  Max: 194/105   Pulse  Min: 52  Max: 68   Resp  Min: 16  Max: 20   SpO2  Min: 91 %  Max: 97 %   Flow (L/min) (Oxygen Therapy)  Min: 2  Max: 2       Input/Output for last 24 hour shift  11/06 0701 - 11/07 0700  In: -   Out: 500 [Urine:500]      Physical Exam:  GENERAL: Patient lying in bed and conversant. No acute distress.   HEENT: Normocephalic and atraumatic. Trachea midline. PER. EOM WNL. Right head incision C/D/I.    LUNGS: Chest rise of normal depth and symmetric. Lungs clear to auscultation bilaterally. No wheezes, rhonchi, or rales.   HEART: S1,S2 detected. Regular rate and rhythm. No rub, murmur, or gallop.   ABDOMEN: Soft, round, nondistended, and nontender. Bowel  sounds present.   EXTREMITIES: No clubbing, edema, or cyanosis. Peripheral pulses present. Skin warm and dry.    NEURO/PSYCH: Alert and oriented. Follows commands. Moves all extremities. No focal deficits.      Results from last 7 days   Lab Units 11/07/24 0318 11/05/24  0751 11/04/24  1348   WBC 10*3/mm3 13.92* 15.27* 14.71*   HEMOGLOBIN g/dL 14.4 14.3 14.8   PLATELETS 10*3/mm3 337 345 329     Results from last 7 days   Lab Units 11/07/24  0318 11/05/24  0751 11/04/24  1348   SODIUM mmol/L 139 137 137   POTASSIUM mmol/L 3.9 4.4 4.2   CO2 mmol/L 23.0 23.0 24.1   BUN mg/dL 19 20 20   CREATININE mg/dL 1.07 1.06 1.07   MAGNESIUM mg/dL 1.9  --   --    PHOSPHORUS mg/dL 3.7  --   --    GLUCOSE mg/dL 110* 118* 166*     Estimated Creatinine Clearance: 78.5 mL/min (by C-G formula based on SCr of 1.07 mg/dL).      I reviewed the patient's new clinical results.  I reviewed the patient's new imaging results/reports including actual images and agree with reports.     Imaging Results (Last 24 Hours)       Procedure Component Value Units Date/Time    CT Head Without Contrast [[id number]] Collected: 11/07/24 1020     Updated: 11/07/24 1027    Narrative:      CT HEAD WO CONTRAST    Date of Exam: 11/7/2024 9:53 AM EST    Indication: neuro change.    Comparison: 11/6/2024.    Technique: Axial CT images were obtained of the head without contrast administration.  Automated exposure control and iterative construction methods were used.      Findings:  Redemonstrated postoperative changes from prior to gavin hole washout of a right-sided subdural hematoma. There is unchanged persistent extra-axial hemorrhage including some hyperdense acute blood products, 13 mm in maximal thickness, with unchanged   right-sided cerebral edema and around 8 mm of leftward midline shift. Thin chronic appearing left-sided extra-axial collection is unchanged. There is no new hemorrhage elsewhere. The ventricles are unchanged including some effacement of the  right lateral   ventricle. The basal cisterns remain patent.      Impression:      Impression:  Unchanged postoperative findings and persistent right-sided extra-axial collection including stable hyperdense acute blood products. There is persistent right-sided cerebral edema and around 8 mm of unchanged leftward midline shift.        Electronically Signed: Mason Hou MD    11/7/2024 10:23 AM EST    Workstation ID: [id number]          Assessment & Plan   Impression      SDH (subdural hematoma)    Type 2 diabetes mellitus, without long-term current use of insulin    HTN (hypertension)    Coronary atherosclerosis due to lipid rich plaque    Generalized weakness    Subdural hematoma, nontraumatic    Headache       Plan        Manjit Thomas is a 69 y.o. male with PMH T2DM, HTN, HLD, CAD s/p JESSE x2 (2019), and recent SDH who was admitted to [facility] on 10/23/24 and underwent MMA embolization on 10/24/24. He developed bradycardia and seizure-like activity and was started on Keppra. He was discharged home on 10/27/24.    He was seen by his PCP for increasing headaches x 1 week with associated anorexia and nausea. He was sent to [facility] ED for evaluation, where CTH showed an acute on chronic SDH with 1-cm midline shift. He was transferred to [facility] and was taken to OR on 11/05/24 for gavin holes per Neurosurgery. Post-operatively, he was transferred to the ICU for higher level of care.     Acute on chronic SDH with clinically significant brain compression s/p gavin holes x 2 per Dr. Jenkins on 11/05/24. Post-operative care per Neurosurgery. CTH 11/06/24 stable. CT on 11/07/2024 stable.  Seizure-like activity: Continue Keppra  T2DM: AccuChecks ACHS, CC diabetic diet. Titrate SSI  HTN: Continue Losartan. Restart home antihypertensives as able.  CAD: Continue Imdur, statin. ASA/Plavix currently on hold. Restart at Neurosurgery's timing.  PT/OT: Recommending home with HH  Replace electrolytes per protocol  AM labs    DVT Prophylaxis:  Mechanical  GI Prophylaxis: N/A  Dispo: Transfer to telemetry. Monitor in hospital for one more day. Likely home tomorrow.    Critical Care time spent in direct patient care: 32 minutes (excluding procedure time, if applicable) including high complexity decision making to assess, manipulate, and support vital organ system failure in this individual who has impairment of one or more vital organ systems such that there is a high probability of imminent or life threatening deterioration in the patient's condition.     Sanjana Ramirez, MSN, APRN, ACNPC-AG  Pulmonary and Critical Care Medicine  Electronically signed by JOSÉ MIGUEL Fox, 11/07/24, 11:34 AM EST.

## 2024-11-08 ENCOUNTER — READMISSION MANAGEMENT (OUTPATIENT)
Dept: CALL CENTER | Facility: HOSPITAL | Age: 69
End: 2024-11-08
Payer: MEDICARE

## 2024-11-08 VITALS
HEIGHT: 71 IN | WEIGHT: 221 LBS | TEMPERATURE: 97.6 F | DIASTOLIC BLOOD PRESSURE: 78 MMHG | HEART RATE: 56 BPM | OXYGEN SATURATION: 92 % | RESPIRATION RATE: 16 BRPM | BODY MASS INDEX: 30.94 KG/M2 | SYSTOLIC BLOOD PRESSURE: 142 MMHG

## 2024-11-08 LAB
ALBUMIN SERPL-MCNC: 3.4 G/DL (ref 3.5–5.2)
ALBUMIN/GLOB SERPL: 1.1 G/DL
ALP SERPL-CCNC: 75 U/L (ref 39–117)
ALT SERPL W P-5'-P-CCNC: 22 U/L (ref 1–41)
ANION GAP SERPL CALCULATED.3IONS-SCNC: 10 MMOL/L (ref 5–15)
AST SERPL-CCNC: 23 U/L (ref 1–40)
BASOPHILS # BLD AUTO: 0.02 10*3/MM3 (ref 0–0.2)
BASOPHILS NFR BLD AUTO: 0.1 % (ref 0–1.5)
BILIRUB SERPL-MCNC: 0.6 MG/DL (ref 0–1.2)
BUN SERPL-MCNC: 23 MG/DL (ref 8–23)
BUN/CREAT SERPL: 23 (ref 7–25)
CALCIUM SPEC-SCNC: 9.7 MG/DL (ref 8.6–10.5)
CHLORIDE SERPL-SCNC: 107 MMOL/L (ref 98–107)
CO2 SERPL-SCNC: 23 MMOL/L (ref 22–29)
CREAT SERPL-MCNC: 1 MG/DL (ref 0.76–1.27)
DEPRECATED RDW RBC AUTO: 41.9 FL (ref 37–54)
EGFRCR SERPLBLD CKD-EPI 2021: 81.5 ML/MIN/1.73
EOSINOPHIL # BLD AUTO: 0 10*3/MM3 (ref 0–0.4)
EOSINOPHIL NFR BLD AUTO: 0 % (ref 0.3–6.2)
ERYTHROCYTE [DISTWIDTH] IN BLOOD BY AUTOMATED COUNT: 13.4 % (ref 12.3–15.4)
GLOBULIN UR ELPH-MCNC: 3.2 GM/DL
GLUCOSE BLDC GLUCOMTR-MCNC: 177 MG/DL (ref 70–130)
GLUCOSE SERPL-MCNC: 177 MG/DL (ref 65–99)
HCT VFR BLD AUTO: 41.4 % (ref 37.5–51)
HGB BLD-MCNC: 14.4 G/DL (ref 13–17.7)
IMM GRANULOCYTES # BLD AUTO: 0.14 10*3/MM3 (ref 0–0.05)
IMM GRANULOCYTES NFR BLD AUTO: 1 % (ref 0–0.5)
LYMPHOCYTES # BLD AUTO: 0.81 10*3/MM3 (ref 0.7–3.1)
LYMPHOCYTES NFR BLD AUTO: 5.9 % (ref 19.6–45.3)
MAGNESIUM SERPL-MCNC: 2.1 MG/DL (ref 1.6–2.4)
MCH RBC QN AUTO: 29.5 PG (ref 26.6–33)
MCHC RBC AUTO-ENTMCNC: 34.8 G/DL (ref 31.5–35.7)
MCV RBC AUTO: 84.8 FL (ref 79–97)
MONOCYTES # BLD AUTO: 0.37 10*3/MM3 (ref 0.1–0.9)
MONOCYTES NFR BLD AUTO: 2.7 % (ref 5–12)
NEUTROPHILS NFR BLD AUTO: 12.5 10*3/MM3 (ref 1.7–7)
NEUTROPHILS NFR BLD AUTO: 90.3 % (ref 42.7–76)
NRBC BLD AUTO-RTO: 0 /100 WBC (ref 0–0.2)
PHOSPHATE SERPL-MCNC: 3.4 MG/DL (ref 2.5–4.5)
PLATELET # BLD AUTO: 383 10*3/MM3 (ref 140–450)
PMV BLD AUTO: 10.5 FL (ref 6–12)
POTASSIUM SERPL-SCNC: 5.3 MMOL/L (ref 3.5–5.2)
PROT SERPL-MCNC: 6.6 G/DL (ref 6–8.5)
RBC # BLD AUTO: 4.88 10*6/MM3 (ref 4.14–5.8)
SODIUM SERPL-SCNC: 140 MMOL/L (ref 136–145)
WBC NRBC COR # BLD AUTO: 13.84 10*3/MM3 (ref 3.4–10.8)

## 2024-11-08 PROCEDURE — 99024 POSTOP FOLLOW-UP VISIT: CPT

## 2024-11-08 PROCEDURE — 82948 REAGENT STRIP/BLOOD GLUCOSE: CPT

## 2024-11-08 PROCEDURE — 99239 HOSP IP/OBS DSCHRG MGMT >30: CPT

## 2024-11-08 PROCEDURE — 83735 ASSAY OF MAGNESIUM: CPT

## 2024-11-08 PROCEDURE — 84100 ASSAY OF PHOSPHORUS: CPT

## 2024-11-08 PROCEDURE — 63710000001 INSULIN LISPRO (HUMAN) PER 5 UNITS: Performed by: NEUROLOGICAL SURGERY

## 2024-11-08 PROCEDURE — 85025 COMPLETE CBC W/AUTO DIFF WBC: CPT

## 2024-11-08 PROCEDURE — 80053 COMPREHEN METABOLIC PANEL: CPT

## 2024-11-08 PROCEDURE — 63710000001 DEXAMETHASONE PER 0.25 MG

## 2024-11-08 RX ORDER — LOSARTAN POTASSIUM 25 MG/1
25 TABLET ORAL
Qty: 30 TABLET | Refills: 1 | Status: SHIPPED | OUTPATIENT
Start: 2024-11-09

## 2024-11-08 RX ORDER — DEXAMETHASONE 1 MG
TABLET ORAL
Qty: 116 TABLET | Refills: 0 | Status: SHIPPED | OUTPATIENT
Start: 2024-11-08 | End: 2024-11-08 | Stop reason: HOSPADM

## 2024-11-08 RX ORDER — DEXAMETHASONE 2 MG/1
2 TABLET ORAL EVERY 12 HOURS SCHEDULED
Status: DISCONTINUED | OUTPATIENT
Start: 2024-11-16 | End: 2024-11-08

## 2024-11-08 RX ORDER — ATORVASTATIN CALCIUM 80 MG/1
80 TABLET, FILM COATED ORAL NIGHTLY
Qty: 90 TABLET | Refills: 1 | Status: SHIPPED | OUTPATIENT
Start: 2024-11-08

## 2024-11-08 RX ORDER — DEXAMETHASONE 4 MG/1
4 TABLET ORAL EVERY 6 HOURS SCHEDULED
Status: DISCONTINUED | OUTPATIENT
Start: 2024-11-08 | End: 2024-11-08

## 2024-11-08 RX ORDER — DEXAMETHASONE 2 MG/1
1 TABLET ORAL EVERY 12 HOURS SCHEDULED
Status: DISCONTINUED | OUTPATIENT
Start: 2024-11-19 | End: 2024-11-08

## 2024-11-08 RX ORDER — DEXAMETHASONE 2 MG/1
2 TABLET ORAL EVERY 8 HOURS SCHEDULED
Status: DISCONTINUED | OUTPATIENT
Start: 2024-11-13 | End: 2024-11-08

## 2024-11-08 RX ORDER — DEXAMETHASONE 1 MG
TABLET ORAL
Qty: 78 TABLET | Refills: 0 | Status: SHIPPED | OUTPATIENT
Start: 2024-11-08 | End: 2024-11-18

## 2024-11-08 RX ORDER — DEXAMETHASONE 4 MG/1
4 TABLET ORAL EVERY 8 HOURS SCHEDULED
Status: DISCONTINUED | OUTPATIENT
Start: 2024-11-08 | End: 2024-11-08 | Stop reason: HOSPADM

## 2024-11-08 RX ORDER — DEXAMETHASONE 2 MG/1
2 TABLET ORAL EVERY 12 HOURS SCHEDULED
Status: DISCONTINUED | OUTPATIENT
Start: 2024-11-13 | End: 2024-11-08 | Stop reason: HOSPADM

## 2024-11-08 RX ORDER — DEXAMETHASONE 2 MG/1
1 TABLET ORAL EVERY 12 HOURS SCHEDULED
Status: DISCONTINUED | OUTPATIENT
Start: 2024-11-16 | End: 2024-11-08 | Stop reason: HOSPADM

## 2024-11-08 RX ADMIN — MUPIROCIN 1 APPLICATION: 20 OINTMENT TOPICAL at 09:22

## 2024-11-08 RX ADMIN — ISOSORBIDE MONONITRATE 30 MG: 30 TABLET, EXTENDED RELEASE ORAL at 09:20

## 2024-11-08 RX ADMIN — FOLIC ACID 1 MG: 1 TABLET ORAL at 09:20

## 2024-11-08 RX ADMIN — LEVETIRACETAM 750 MG: 750 TABLET, FILM COATED ORAL at 09:21

## 2024-11-08 RX ADMIN — LOSARTAN POTASSIUM 25 MG: 25 TABLET, FILM COATED ORAL at 09:20

## 2024-11-08 RX ADMIN — CYANOCOBALAMIN TAB 1000 MCG 1000 MCG: 1000 TAB at 09:20

## 2024-11-08 RX ADMIN — DEXAMETHASONE 4 MG: 4 TABLET ORAL at 06:58

## 2024-11-08 RX ADMIN — INSULIN LISPRO 2 UNITS: 100 INJECTION, SOLUTION INTRAVENOUS; SUBCUTANEOUS at 09:20

## 2024-11-08 NOTE — PLAN OF CARE
Problem: Adult Inpatient Plan of Care  Goal: Plan of Care Review  Outcome: Progressing  Goal: Patient-Specific Goal (Individualized)  Outcome: Progressing  Goal: Absence of Hospital-Acquired Illness or Injury  Outcome: Progressing  Intervention: Identify and Manage Fall Risk  Recent Flowsheet Documentation  Taken 11/7/2024 2000 by Cintia Blake RN  Safety Promotion/Fall Prevention:   room organization consistent   safety round/check completed   toileting scheduled  Intervention: Prevent Skin Injury  Recent Flowsheet Documentation  Taken 11/7/2024 2000 by Cintia Blake RN  Body Position: position changed independently  Skin Protection:   incontinence pads utilized   transparent dressing maintained  Intervention: Prevent and Manage VTE (Venous Thromboembolism) Risk  Recent Flowsheet Documentation  Taken 11/7/2024 2000 by Cintia Blake RN  VTE Prevention/Management:   bilateral   SCDs (sequential compression devices) on  Intervention: Prevent Infection  Recent Flowsheet Documentation  Taken 11/7/2024 2000 by Cintia Blake RN  Infection Prevention:   cohorting utilized   environmental surveillance performed   equipment surfaces disinfected   hand hygiene promoted   personal protective equipment utilized   rest/sleep promoted   single patient room provided   visitors restricted/screened  Goal: Optimal Comfort and Wellbeing  Outcome: Progressing  Intervention: Provide Person-Centered Care  Recent Flowsheet Documentation  Taken 11/7/2024 2000 by Cintia Blake RN  Trust Relationship/Rapport:   care explained   choices provided   reassurance provided   thoughts/feelings acknowledged  Goal: Readiness for Transition of Care  Outcome: Progressing     Problem: Comorbidity Management  Goal: Maintenance of Asthma Control  Outcome: Progressing  Intervention: Maintain Asthma Symptom Control  Recent Flowsheet Documentation  Taken 11/7/2024 2000 by Citnia Blake RN  Medication Review/Management:  medications reviewed  Goal: Maintenance of Behavioral Health Symptom Control  Outcome: Progressing  Intervention: Maintain Behavioral Health Symptom Control  Recent Flowsheet Documentation  Taken 11/7/2024 2000 by Cintia Blake RN  Medication Review/Management: medications reviewed  Goal: Maintenance of COPD Symptom Control  Outcome: Progressing  Intervention: Maintain COPD (Chronic Obstructive Pulmonary Disease) Symptom Control  Recent Flowsheet Documentation  Taken 11/7/2024 2000 by Cintia Blake RN  Medication Review/Management: medications reviewed  Goal: Blood Glucose Level Within Target Range  Outcome: Progressing  Intervention: Monitor and Manage Glycemia  Recent Flowsheet Documentation  Taken 11/7/2024 2000 by Cintia Blake RN  Medication Review/Management: medications reviewed  Goal: Maintenance of Heart Failure Symptom Control  Outcome: Progressing  Intervention: Maintain Heart Failure Management  Recent Flowsheet Documentation  Taken 11/7/2024 2000 by Cintia Blake RN  Medication Review/Management: medications reviewed  Goal: Blood Pressure in Desired Range  Outcome: Progressing  Intervention: Maintain Blood Pressure Management  Recent Flowsheet Documentation  Taken 11/7/2024 2000 by Cintia Blake RN  Medication Review/Management: medications reviewed  Goal: Maintenance of Osteoarthritis Symptom Control  Outcome: Progressing  Intervention: Maintain Osteoarthritis Symptom Control  Recent Flowsheet Documentation  Taken 11/7/2024 2000 by Cintia Blake RN  Activity Management: activity encouraged  Medication Review/Management: medications reviewed  Goal: Bariatric Home Regimen Maintained  Outcome: Progressing  Intervention: Maintain and Manage Postbariatric Surgery Care  Recent Flowsheet Documentation  Taken 11/7/2024 2000 by Cintia Blake RN  Medication Review/Management: medications reviewed  Goal: Maintenance of Seizure Control  Outcome: Progressing  Intervention:  Maintain Seizure Symptom Control  Recent Flowsheet Documentation  Taken 11/7/2024 2000 by Cintia Blake, RN  Sensory Stimulation Regulation:   auditory stimulation minimized   care clustered   lighting decreased   quiet environment promoted   television on   visual stimulation minimized  Medication Review/Management: medications reviewed     Problem: Skin Injury Risk Increased  Goal: Skin Health and Integrity  Outcome: Progressing  Intervention: Optimize Skin Protection  Recent Flowsheet Documentation  Taken 11/7/2024 2000 by Cintia Blake, RN  Activity Management: activity encouraged  Pressure Reduction Techniques: frequent weight shift encouraged  Head of Bed (HOB) Positioning: HOB elevated  Pressure Reduction Devices:   positioning supports utilized   pressure-redistributing mattress utilized  Skin Protection:   incontinence pads utilized   transparent dressing maintained     Problem: Fall Injury Risk  Goal: Absence of Fall and Fall-Related Injury  Outcome: Progressing  Intervention: Identify and Manage Contributors  Recent Flowsheet Documentation  Taken 11/7/2024 2000 by Cintia Blake, RN  Medication Review/Management: medications reviewed  Intervention: Promote Injury-Free Environment  Recent Flowsheet Documentation  Taken 11/7/2024 2000 by Cintia Blake, RN  Safety Promotion/Fall Prevention:   room organization consistent   safety round/check completed   toileting scheduled   Goal Outcome Evaluation:

## 2024-11-08 NOTE — PROGRESS NOTES
Chief complaint: Subdural hematoma, s/p gavin holes     Admit Diagnosis:   SDH (subdural hematoma) [S06.5XAA]     Subjective: Patient is much more awake and alert this morning. Sitting up in bed and pleasant and conversant. Appears to be acting himself again. Wife is at the bedside and states he got a good night's rest last night and has been doing much better.     Objective:    Vitals:    24 0600   BP: 125/80   Pulse: (!) 45   Resp:    Temp:    SpO2: 91%     Pulse  Av.5  Min: 45  Max: 60  Systolic (24hrs), Av , Min:103 , Max:154     Diastolic (24hrs), Av, Min:67, Max:93    Temp (24hrs), Av.2 °F (36.8 °C), Min:97.8 °F (36.6 °C), Max:98.6 °F (37 °C)      Sitting up in bed, pleasant and conversant. Able to move all 4 extremities. Appears to be neurologically intact and back to baseline. Able to answer all orientation questions this morning.     Lab Results   Component Value Date     2024       A/P:   Admit Diagnosis:   SDH  S/p burrholes  S/p bilateral MMA embolizations  Sleep Deprivation     Continue PT/OT. I received multiple calls yesterday in regard to his neurostatus, however, I believe there was some component of sleep deprivation vs. ICU psychosis. I loosened parameters for neuro checks and out of an abundance of precaution, made him NPO. Can lift NPO this morning. Can be discharged, when medically ready from a neurosurgery standpoint. I recommend slow Decadron taper upon discharge and continue Keppra. He can follow up in 2 weeks. Patient is currently on Decadron 4q8. He can go another 5 days on this, then decrease to Decadron 2q 12 for 3 days, then 1q12 for 3 days, then stop.

## 2024-11-08 NOTE — CASE MANAGEMENT/SOCIAL WORK
Continued Stay Note  [facility]     Patient Name: Manjit Thomas  MRN: [id number]  Today's Date: 11/8/2024    Admit Date: 11/4/2024    Plan: Home   Discharge Plan       Row Name 11/08/24 1205       Plan    Plan Home    Plan Comments Patient to discharge home with family today.  Patient and family decline HH.  No needs identified or voiced.                   Discharge Codes    No documentation.                 Expected Discharge Date and Time       Expected Discharge Date Expected Discharge Time    Nov 8, 2024               Sindhu Ponce RN

## 2024-11-08 NOTE — OUTREACH NOTE
Prep Survey      Flowsheet Row Responses   [facility] patient discharged from? [facility]   Is LACE score < 7 ? No   Eligibility Readm Mgmt   Discharge diagnosis TITA HOLE FOR RIGHT SUBDURAL HEMATOMA   Does the patient have one of the following disease processes/diagnoses(primary or secondary)? General Surgery   Prep survey completed? Yes            Arlette MORALES - Registered Nurse

## 2024-11-08 NOTE — DISCHARGE SUMMARY
Discharge Summary    Patient name: Manjit Thomas  CSN: [id number]  MRN: [id number]  : 1955  Today's date: 2024     Date of Admission: 2024  Date of Discharge:  2024    Admitting Physician:  Dr. Dena Urbina DO; Intensivist  Primary Care Provider: Ariel Lee DO  Consultations:  Dr. Manjit Jenkins MD; Neurosurgery    Admission Diagnosis: SDH     Discharge Diagnoses:   Active Hospital Problems    Diagnosis     **SDH (subdural hematoma)     Headache     Type 2 diabetes mellitus, without long-term current use of insulin     HTN (hypertension)     Coronary atherosclerosis due to lipid rich plaque     Generalized weakness     Subdural hematoma, nontraumatic        Allergies:  Benzodiazepines, Codeine, and Phenobarbital    Code Status and Medical Interventions: CPR (Attempt to Resuscitate); Full Support   Ordered at: 24 5352     Level Of Support Discussed With:    Patient     Code Status (Patient has no pulse and is not breathing):    CPR (Attempt to Resuscitate)     Medical Interventions (Patient has pulse or is breathing):    Full Support       Procedures/Testing:  Procedure(s):  GAVIN HOLE FOR RIGHT SUBDURAL HEMATOMA     History of Present Illness & Hospital Course:   Manjit Thomas is a 69 y.o. male with PMH T2DM, HTN, HLD, CAD s/p JESSE x2 (2019), and recent SDH who was admitted to [facility] on 10/23/24 and underwent MMA embolization on 10/24/24. He developed bradycardia and seizure-like activity and was started on Keppra. He was discharged home on 10/27/24.     He was seen by his PCP for increasing headaches x 1 week with associated anorexia and nausea. He was sent to [facility] ED for evaluation, where CTH showed an acute on chronic SDH with 1-cm midline shift. He was transferred to [facility] and was taken to OR on 24 for gavin holes per Neurosurgery. Post-operatively, he was transferred to the ICU for higher level of care.     He had an uncomplicated clinical course. On  "11/07/24, nursing staff had concerns regarding increased drowsiness. CTH stable and drowsiness was felt to be secondary to sleep deprivation. He worked with PT/OT and was felt appropriate for discharge home with assistance and home health. As of 11/08/24, patient has been deemed appropriate for discharge home. Discharge instructions below.    Vitals:  /78   Pulse 56   Temp 97.6 °F (36.4 °C) (Axillary)   Resp 16   Ht 180.3 cm (71\")   Wt 100 kg (221 lb)   SpO2 92%   BMI 30.82 kg/m²     Physical Exam:  Constitutional:  Appears well-developed and well-nourished. No distress.   HEENT:  Normocephalic and atraumatic. PERRL. Right head dressing C/D/I.  Neck:  Neck supple. No JVD present.   CV: Normal rate, regular rhythm,  intact distal pulses.  No gallop, murmur or rub.  Pulmonary/Chest: Effort normal and breath sounds normal. No respiratory distress. No wheezes, rhonchi or rales.   Abdominal: Soft. +BS. No distension and no mass. There is no tenderness.   Musculoskeletal: Normal muscle tone and strength  Neurological: Alert and oriented to person, place, and time.  No focal deficits.  Skin: Skin is warm and dry. No rash noted.   Extremities:  No clubbing, edema or cyanosis  Psychiatric: Normal mood and affect. Behavior is normal.     Labs:  Results from last 7 days   Lab Units 11/08/24  0403   WBC 10*3/mm3 13.84*   HEMOGLOBIN g/dL 14.4   HEMATOCRIT % 41.4   PLATELETS 10*3/mm3 383     Results from last 7 days   Lab Units 11/08/24  0403   SODIUM mmol/L 140   POTASSIUM mmol/L 5.3*   CHLORIDE mmol/L 107   CO2 mmol/L 23.0   BUN mg/dL 23   CREATININE mg/dL 1.00   CALCIUM mg/dL 9.7   BILIRUBIN mg/dL 0.6   ALK PHOS U/L 75   ALT (SGPT) U/L 22   AST (SGOT) U/L 23   GLUCOSE mg/dL 177*         Magnesium   Date Value Ref Range Status   11/08/2024 2.1 1.6 - 2.4 mg/dL Final   11/07/2024 1.9 1.6 - 2.4 mg/dL Final     Phosphorus   Date Value Ref Range Status   11/08/2024 3.4 2.5 - 4.5 mg/dL Final   11/07/2024 3.7 2.5 - 4.5 " mg/dL Final                    Discharge Medications        New Medications        Instructions Start Date   dexAMETHasone 1 MG tablet  Commonly known as: DECADRON   Take 4 tablets by mouth Every 8 (Eight) Hours for 5 days, THEN 2 tablets Every 12 (Twelve) Hours for 3 days, THEN 1 tablet Every 12 (Twelve) Hours for 3 days.   Start Date: November 8, 2024     losartan 25 MG tablet  Commonly known as: COZAAR   25 mg, Oral, Every 24 Hours Scheduled   Start Date: November 9, 2024            Changes to Medications        Instructions Start Date   atorvastatin 80 MG tablet  Commonly known as: LIPITOR  What changed:   medication strength  when to take this   80 mg, Oral, Nightly             Continue These Medications        Instructions Start Date   Farxiga 10 MG tablet  Generic drug: dapagliflozin Propanediol   10 mg, Daily      fluticasone 50 MCG/ACT nasal spray  Commonly known as: FLONASE   2 sprays, Daily      folic acid 1 MG tablet  Commonly known as: FOLVITE   1 mg, Daily      isosorbide mononitrate 30 MG 24 hr tablet  Commonly known as: IMDUR   30 mg, Daily      levETIRAcetam 750 MG tablet  Commonly known as: Keppra   750 mg, Oral, 2 Times Daily      vitamin B-12 1000 MCG tablet  Commonly known as: CYANOCOBALAMIN   1,000 mcg, Daily             Stop These Medications      amLODIPine 10 MG tablet  Commonly known as: NORVASC     aspirin 81 MG EC tablet     Chlorhexidine Gluconate 4 % solution     lisinopril 5 MG tablet  Commonly known as: PRINIVIL,ZESTRIL     mupirocin 2 % nasal ointment  Commonly known as: BACTROBAN            Follow-up Appointments  Future Appointments   Date Time Provider Department Center   11/13/2024 12:45 PM Manjit Jenkins MD [facility]     Discharge Instructions:  Discharge home today  Medications as above  Follow up as above  Home health per CM  Return to the hospital or call 911 with recurrence of symptoms    Sanjana Ramirez, MSN, APRN, ACNPC-AG  Pulmonary and Critical Care  Medicine  Electronically signed by Neha Ramirez, JOSÉ MIGUEL, 11/08/24, 10:31 AM EST.     Time: I spent 35 minutes on this discharge activity which included: face-to-face encounter with the patient, reviewing the data in the system, coordination of the care with the nursing staff as well as consultants, documentation, and entering orders.      CC: Ariel Lee, DO

## 2024-11-13 ENCOUNTER — READMISSION MANAGEMENT (OUTPATIENT)
Dept: CALL CENTER | Facility: HOSPITAL | Age: 69
End: 2024-11-13
Payer: MEDICARE

## 2024-11-13 NOTE — OUTREACH NOTE
General Surgery Week 1 Survey      Flowsheet Row Responses   [facility] patient discharged from? [place]   Does the patient have one of the following disease processes/diagnoses(primary or secondary)? General Surgery   Week 1 attempt successful? Yes   Call start time 1626   Call end time 1634   Person spoke with today (if not patient) and relationship Patient   Meds reviewed with patient/caregiver? Yes   Does the patient have all medications related to this admission filled (includes all antibiotics, pain medications, etc.) Yes   Is the patient taking all medications as directed (includes completed medication regime)? Yes   Does the patient have a follow up appointment scheduled with their surgeon? Yes  [11/25]   Has the patient kept scheduled appointments due by today? Yes   Has home health visited the patient within 72 hours of discharge? N/A   Psychosocial issues? No   Did the patient receive a copy of their discharge instructions? Yes   Nursing interventions Reviewed instructions with patient   What is the patient's perception of their health status since discharge? Same   Is the patient /caregiver able to teach back basic post-op care? Keep incision areas clean,dry and protected   Is the patient/caregiver able to teach back signs and symptoms of incisional infection? Increased redness, swelling or pain at the incisonal site, Increased drainage or bleeding  [Patient reports that he is having increased drainage. Advised to contact surgeons office and let them know. Patient states that he is seeing PCP in the morning.]   Is the patient/caregiver able to teach back steps to recovery at home? Set small, achievable goals for return to baseline health, Rest and rebuild strength, gradually increase activity   If the patient is a current smoker, are they able to teach back resources for cessation? Not a smoker   Is the patient/caregiver able to teach back the hierarchy of who to call/visit for symptoms/problems?  PCP, Specialist, Home health nurse, Urgent Care, ED, 911 Yes   Week 1 call completed? Yes   Is the patient interested in additional calls from an ambulatory ? No   Would this patient benefit from a Referral to [facility] Social Work? No   Call end time 0993            DENIA KERR - Registered Nurse

## 2024-11-25 ENCOUNTER — READMISSION MANAGEMENT (OUTPATIENT)
Dept: CALL CENTER | Facility: HOSPITAL | Age: 69
End: 2024-11-25
Payer: MEDICARE

## 2024-11-25 NOTE — OUTREACH NOTE
General Surgery Week 3 Survey      Flowsheet Row Responses   [facility] patient discharged from? [place]   Does the patient have one of the following disease processes/diagnoses(primary or secondary)? General Surgery   Week 3 attempt successful? Yes   Call start time 1632   Call end time 1637   Discharge diagnosis TITA HOLE FOR RIGHT SUBDURAL HEMATOMA   Meds reviewed with patient/caregiver? Yes   Is the patient having any side effects they believe may be caused by any medication additions or changes? No   Does the patient have all medications related to this admission filled (includes all antibiotics, pain medications, etc.) Yes   Is the patient taking all medications as directed (includes completed medication regime)? Yes   Medication comments completed steroids   Does the patient have a follow up appointment scheduled with their surgeon? Yes   Has the patient kept scheduled appointments due by today? Yes   Psychosocial issues? No   What is the patient's perception of their health status since discharge? New symptoms unrelated to diagnosis   Is the patient /caregiver able to teach back basic post-op care? Keep incision areas clean,dry and protected   Is the patient/caregiver able to teach back signs and symptoms of incisional infection? Increased redness, swelling or pain at the incisonal site, Increased drainage or bleeding, Incisional warmth, Pus or odor from incision, Fever   Is the patient/caregiver able to teach back steps to recovery at home? Set small, achievable goals for return to baseline health, Rest and rebuild strength, gradually increase activity   If the patient is a current smoker, are they able to teach back resources for cessation? Not a smoker   Is the patient/caregiver able to teach back the hierarchy of who to call/visit for symptoms/problems? PCP, Specialist, Home health nurse, Urgent Care, ED, 911 Yes   Additional teach back comments States he has a rash on his upper torso.  Advised  to contact PCP regarding this.  States he is out of town.  Told him there should be someone who is covering for him and to talk with MA or nurse.  Incision site is healing with still some slight drainage per wife.  She does dressing change every day.  Due to rash they rescheduled his follow up til Wed.   Week 3 call completed? Yes   Call end time [phone]            Carmen BAKER - Licensed Nurse

## 2024-11-27 ENCOUNTER — TELEPHONE (OUTPATIENT)
Dept: NEUROSURGERY | Facility: CLINIC | Age: 69
End: 2024-11-27

## 2024-11-27 ENCOUNTER — HOSPITAL ENCOUNTER (OUTPATIENT)
Dept: CT IMAGING | Facility: HOSPITAL | Age: 69
Discharge: HOME | End: 2024-11-27
Payer: MEDICARE

## 2024-11-27 ENCOUNTER — OFFICE VISIT (OUTPATIENT)
Dept: NEUROSURGERY | Facility: CLINIC | Age: 69
End: 2024-11-27
Payer: MEDICARE

## 2024-11-27 VITALS
SYSTOLIC BLOOD PRESSURE: 132 MMHG | BODY MASS INDEX: 30.99 KG/M2 | DIASTOLIC BLOOD PRESSURE: 70 MMHG | TEMPERATURE: 97.5 F | HEIGHT: 70 IN | WEIGHT: 216.5 LBS

## 2024-11-27 DIAGNOSIS — I62.00 SUBDURAL HEMATOMA, NONTRAUMATIC: Primary | ICD-10-CM

## 2024-11-27 PROCEDURE — 3078F DIAST BP <80 MM HG: CPT

## 2024-11-27 PROCEDURE — 70450 CT HEAD/BRAIN W/O DYE: CPT

## 2024-11-27 PROCEDURE — 1159F MED LIST DOCD IN RCRD: CPT

## 2024-11-27 PROCEDURE — 3075F SYST BP GE 130 - 139MM HG: CPT

## 2024-11-27 PROCEDURE — 1160F RVW MEDS BY RX/DR IN RCRD: CPT

## 2024-11-27 PROCEDURE — 99024 POSTOP FOLLOW-UP VISIT: CPT

## 2024-11-27 RX ORDER — LISINOPRIL 2.5 MG/1
2.5 TABLET ORAL DAILY
COMMUNITY
Start: 2024-07-31

## 2024-11-27 RX ORDER — DIPHENOXYLATE HYDROCHLORIDE AND ATROPINE SULFATE 2.5; .025 MG/1; MG/1
1 TABLET ORAL DAILY
COMMUNITY

## 2024-11-27 RX ORDER — AMLODIPINE BESYLATE 10 MG/1
10 TABLET ORAL DAILY
COMMUNITY
Start: 2024-07-31

## 2024-11-27 NOTE — PROGRESS NOTES
Name: Manjit Thomas    : 1955     MRN: [id number]     Primary Care Provider: Ariel Lee DO    Chief Complaint  Post-op ([place] hole)      History of Present Illness:  Manjit Thomas is a 69 y.o. male who underwent right MMA embolization on 10/24/2024 with Dr. Jenkins. He ultimately was discharged from the hospital on 10/27/2024 but was seen by his PCP for increasing headaches and was sent to [facility] ED and repeat head CT scan revealed worsening right sided SDH and he was transferred to [facility] for higher level care. On 2024, Dr. Jenkins performed right sided bur hole for evacuation of subacute convexity subdural hematoma. He was discharged on 2024 and is here today for his postoperative incision check. Overall, he reports that he has been doing well since discharge. He has completed his steroid taper and continues to take Keppra for seizure prophylaxis. He does tell me that he had an episode, about one week ago where he felt as if his left upper extremity went weak for about two minutes. He denies any other symptoms concerning for worsening subdural hematoma such as changes in vision, speech, seizure like activity, or neurologic symptoms of cortical irritation. He denies any fever, chills, or signs concerning for infection.     The following portions of the patient's history were reviewed and updated as appropriate and include current medications, allergies, allergies, past family history, past medical history, past social history, past surgical history and problem list.     PMHX  Allergies:  Allergies   Allergen Reactions    Benzodiazepines     Codeine Nausea Only     Other Reaction(s) from [facility]: Nausea    Phenobarbital Itching     Medications    Current Outpatient Medications:     amLODIPine (NORVASC) 10 MG tablet, Take 1 tablet by mouth Daily., Disp: , Rfl:     atorvastatin (LIPITOR) 80 MG tablet, Take 1 tablet by mouth Every Night., Disp: 90 tablet, Rfl: 1    dapagliflozin  Propanediol (Farxiga) 10 MG tablet, Take 10 mg by mouth Daily., Disp: , Rfl:     fluticasone (FLONASE) 50 MCG/ACT nasal spray, Administer 2 sprays into the nostril(s) as directed by provider Daily., Disp: , Rfl:     folic acid (FOLVITE) 1 MG tablet, Take 1 tablet by mouth Daily., Disp: , Rfl:     isosorbide mononitrate (IMDUR) 30 MG 24 hr tablet, Take 1 tablet by mouth Daily., Disp: , Rfl:     levETIRAcetam (Keppra) 750 MG tablet, Take 1 tablet by mouth 2 (Two) Times a Day for 90 days., Disp: 60 tablet, Rfl: 2    lisinopril (PRINIVIL,ZESTRIL) 2.5 MG tablet, Take 1 tablet by mouth Daily., Disp: , Rfl:     losartan (COZAAR) 25 MG tablet, Take 1 tablet by mouth Daily., Disp: 30 tablet, Rfl: 1    multivitamin (THERAGRAN) tablet tablet, Take 1 tablet by mouth Daily., Disp: , Rfl:     vitamin B-12 (CYANOCOBALAMIN) 1000 MCG tablet, Take 1 tablet by mouth Daily., Disp: , Rfl:   Past Medical History:  Past Medical History:   Diagnosis Date    Coronary atherosclerosis     Diabetes mellitus     Headache     HLD (hyperlipidemia)     Hypertension     Sinus bradycardia     Vitamin B12 deficiency      Past Surgical History:  Past Surgical History:   Procedure Laterality Date    TITA HOLE Right 11/5/2024    Procedure: TITA HOLE FOR RIGHT SUBDURAL HEMATOMA;  Surgeon: Manjit Jenkins MD;  Location: [facility] OR;  Service: Neurosurgery;  Laterality: Right;    CORONARY ANGIOPLASTY WITH STENT PLACEMENT N/A     EMBOLIZATION CEREBRAL N/A 10/24/2024    Procedure: MMA Emoblization;  Surgeon: Manjit Jenkins MD;  Location:  [facility];  Service: Interventional Radiology;  Laterality: N/A;     Social Hx:  Social History     Tobacco Use    Smoking status: Never    Smokeless tobacco: Never   Vaping Use    Vaping status: Never Used   Substance Use Topics    Alcohol use: Never    Drug use: Never     Family Hx:  Family History   Problem Relation Age of Onset    Heart attack Father     Coronary artery disease  Brother     Other (CABG) Brother      Review of Systems:        Review of Systems   Constitutional:  Negative for activity change, appetite change, chills, diaphoresis, fatigue, fever and unexpected weight change.   HENT:  Negative for congestion, dental problem, drooling, ear discharge, ear pain, facial swelling, hearing loss, mouth sores, nosebleeds, postnasal drip, rhinorrhea, sinus pressure, sinus pain, sneezing, sore throat, tinnitus, trouble swallowing and voice change.    Eyes:  Negative for photophobia, pain, discharge, redness, itching and visual disturbance.   Respiratory:  Negative for apnea, cough, choking, chest tightness, shortness of breath, wheezing and stridor.    Cardiovascular:  Negative for chest pain, palpitations and leg swelling.   Gastrointestinal:  Negative for abdominal distention, abdominal pain, anal bleeding, blood in stool, constipation, diarrhea, nausea, rectal pain and vomiting.   Endocrine: Negative for cold intolerance, heat intolerance, polydipsia, polyphagia and polyuria.   Genitourinary:  Negative for decreased urine volume, difficulty urinating, dysuria, enuresis, flank pain, frequency, genital sores, hematuria and urgency.   Musculoskeletal:  Negative for arthralgias, back pain, gait problem, joint swelling, myalgias, neck pain and neck stiffness.   Skin:  Negative for color change, pallor, rash and wound.   Allergic/Immunologic: Negative for environmental allergies, food allergies and immunocompromised state.   Neurological:  Negative for dizziness, tremors, seizures, syncope, facial asymmetry, speech difficulty, weakness, light-headedness, numbness and headaches.   Hematological:  Negative for adenopathy. Does not bruise/bleed easily.   Psychiatric/Behavioral:  Negative for agitation, behavioral problems, confusion, decreased concentration, dysphoric mood, hallucinations, self-injury, sleep disturbance and suicidal ideas. The patient is not nervous/anxious and is not  "hyperactive.       The remaining review of systems is negative as otherwise stated in the HPI.    Vital Signs: /70   Temp 97.5 °F (36.4 °C) (Infrared)   Ht 177.8 cm (70\")   Wt 98.2 kg (216 lb 8 oz)   BMI 31.06 kg/m²        Physical Exam        File Link    Scan on 11/27/2024 1149 by Kathryn Min PA: 3 weeks post-op        Key Information    Document ID File Type Document Type Description   [id number] Image WOUND IMAGE 3 weeks post-op     Import Information    Attached At Date Time User Dept   Encounter Level 11/27/2024 11:49 AM Kathryn Min PA [facility]     Encounter    Office Visit on 11/27/24 with Priya Lynn PA-C   Incision: Dry and intact.   Patient is awake, alert, and oriented. No acute distress. Appears to be overall neurologically intact. Able to ambulate without assistance.     Social History    Tobacco Use      Smoking status: Never      Smokeless tobacco: Never       Tobacco Use: Low Risk  (11/27/2024)    Patient History     Smoking Tobacco Use: Never     Smokeless Tobacco Use: Never     Passive Exposure: Not on file           STEADI Fall Risk Assessment was completed, and patient is at LOW risk for falls.Assessment completed on:11/27/2024      Diagnostic Studies: He has no new imaging for my review.       Assessment/Plan    Diagnoses and all orders for this visit:    1. Subdural hematoma, nontraumatic (Primary)  -     CT Head Without Contrast  -     Cancel: CT Head Without Contrast; Future  -     CT Head Without Contrast; Future         This is a 69 year old male who is s/p right MMA embolization for right sided SDH on 10/24/24 with Dr. Jenkins who ultimately underwent right sided bur holes for evacuation of persistent right SDH with Dr. Jenkins on 11/5/2024. He reports that overall he is feeling well, but tells me about an episode of right upper extremity weakness that occurred about one week ago without sequela. I feel that out of an abundance of precaution, I should " send him over to the hospital today for a repeat head CT to ensure that his SDH has begin to resolve to some degree. I will call his wife today with the results. Otherwise, he can follow up with Dr. Jenkins in about 6 weeks with a new repeat head CT. I carefully reviewed the signs/symptoms associated with worsening subdural hematoma and cortical irritation that would require him to reach out to our office or seek further urgent/emergent care. His wife and daughter were present during this visit today.   Patient encouraged to contact us if he has any changes in his condition or any concerns.    Any copied data from previous notes included in the (1) HPI, (2) PE, (3) MDM and/or Assessment and Plan has been reviewed and accurate as of 11/27/24.    Priya Lynn PA-C  11/27/24

## 2024-11-27 NOTE — TELEPHONE ENCOUNTER
Head CT reviewed this afternoon after our office visit and is stable. I called and spoke with Mrs. Thomas on this phone this afternoon and let her know that I reviewed Mr. Thomas head CT. Again, I reviewed the signs/symptoms of worsening SDH and cortical irritation that would require further workup and for them to reach out to our office. They can follow up with a new head CT in about a month.     Priya Lynn PA-C    no

## 2025-01-07 ENCOUNTER — TELEPHONE (OUTPATIENT)
Dept: NEUROSURGERY | Facility: CLINIC | Age: 70
End: 2025-01-07
Payer: MEDICARE

## 2025-01-07 NOTE — TELEPHONE ENCOUNTER
Provider: DR IRIZARRY    Caller: VERONICA VELIZ    Relationship to Patient: WIFE      Phone Number: [phone]    Reason for Call: WIFE CALLED TO RESCHEDULE PT BECAUSE HE IS SICK.  HUB CALLED CENTRAL SCHEDULING WITH PATIENT AND RESCHEDULED CT AND APPT IN OUR OFFICE. FIRST AVAILABLE WAS 2-17-25.  JUST WANTED TO MAKE SURE THIS IS OK.      When was the patient last seen: 11-27-24

## 2025-01-16 ENCOUNTER — TELEPHONE (OUTPATIENT)
Dept: NEUROSURGERY | Facility: CLINIC | Age: 70
End: 2025-01-16
Payer: MEDICARE

## 2025-01-16 NOTE — TELEPHONE ENCOUNTER
Caller: VERONICA VELIZ    Relationship: Emergency Contact    Best call back number: [phone]    Requested Prescriptions: losartan (COZAAR) 25 MG tablet      Pharmacy where request should be sent:  [facility]    Last office visit with prescribing clinician: Visit date not found   Last telemedicine visit with prescribing clinician: Visit date not found   Next office visit with prescribing clinician: 1/20/2025     Additional details provided by patient: PATIENTS WIFE CALLED AND STATES THAT  [facility]. WROTE PRESCRIPTION FOR PATIENT-HE HAS LESS THAN A 3 DAY SUPPLY    Does the patient have less than a 3 day supply:  [x] Yes  [] No    Would you like a call back once the refill request has been completed: [] Yes [x] No    If the office needs to give you a call back, can they leave a voicemail: [] Yes [x] No    Verona Chaparro Rep   01/16/25 11:35 EST

## 2025-01-16 NOTE — TELEPHONE ENCOUNTER
PATIENTS WIFE VERONICA CALLED AND STATES SHE CALLED THE NUMBER THAT WAS GIVEN TO HER AND THEY TRANSFERRED HER BACK TO [facility].  THE MEDS WERE ORDERED BY Sanjana Ramirez, MSN, APRN, [facility] Pulmonary [facility] Critical Care [facility] WHILE PATIENT WAS IN THE HOSPITAL 11/4/2024.    Admission (Discharged) with Case, Dena PHAN DO (11/04/2024)     SHE IS HAVING TROUBLE FINDING THE CORRECT MD WITH WHOM SHOULD ORDER THIS MEDICATION.  SHE IS UNSURE WHAT IT IS FOR AND WOULD LIKE TO KNOW IF IT IS VITAL TO HIS HEALTH.    PLEASE CALL VERONICA @ [phone].      THANK YOU

## 2025-01-16 NOTE — TELEPHONE ENCOUNTER
Dr. Jenkins did not prescribe patient Losartan, JOSÉ MIGUEL Martinez did on 11-9-24.    Called patient and spoke to his wife and gave her this provider's office so that she could reach out to her office.     *this provider is not a part of our office in neurosurgery.*

## 2025-01-29 ENCOUNTER — HOSPITAL ENCOUNTER (OUTPATIENT)
Facility: HOSPITAL | Age: 70
Discharge: HOME | End: 2025-01-29
Payer: MEDICARE

## 2025-01-29 ENCOUNTER — OFFICE VISIT (OUTPATIENT)
Dept: NEUROSURGERY | Facility: CLINIC | Age: 70
End: 2025-01-29
Payer: MEDICARE

## 2025-01-29 VITALS — TEMPERATURE: 97.7 F | HEIGHT: 70 IN | BODY MASS INDEX: 31.68 KG/M2 | WEIGHT: 221.3 LBS

## 2025-01-29 DIAGNOSIS — I62.00 SUBDURAL HEMATOMA, NONTRAUMATIC: ICD-10-CM

## 2025-01-29 DIAGNOSIS — R56.9 SEIZURE: Primary | ICD-10-CM

## 2025-01-29 PROCEDURE — 99024 POSTOP FOLLOW-UP VISIT: CPT | Performed by: NEUROLOGICAL SURGERY

## 2025-01-29 PROCEDURE — 70450 CT HEAD/BRAIN W/O DYE: CPT

## 2025-01-29 NOTE — PROGRESS NOTES
Subjective     Chief Complaint: Follow-up subdural hematoma    Patient ID: Manjit Thomas is a 69 y.o. male is here today for follow-up.    Post-op      This is a 69-year-old man in whom I performed a right MMA embolization.  He required bur hole evacuation of some residual, symptomatic blood products.  He presents today to discuss the results of his most recent surveillance head CT.  He denies any seizures, nausea, vomiting, strokelike symptoms, TIAs, or headaches.    The following portions of the patient's history were reviewed and updated as appropriate: allergies, current medications, past family history, past medical history, past social history, past surgical history and problem list.    Family history:   Family History   Problem Relation Age of Onset    Heart attack Father     Coronary artery disease Brother     Other (CABG) Brother        Social history:   Social History     Socioeconomic History    Marital status:    Tobacco Use    Smoking status: Never    Smokeless tobacco: Never   Vaping Use    Vaping status: Never Used   Substance and Sexual Activity    Alcohol use: Never    Drug use: Never    Sexual activity: Defer       Review of Systems   Constitutional:  Negative for activity change, appetite change, chills, diaphoresis, fatigue, fever and unexpected weight change.   HENT:  Negative for congestion, dental problem, drooling, ear discharge, ear pain, facial swelling, hearing loss, mouth sores, nosebleeds, postnasal drip, rhinorrhea, sinus pressure, sinus pain, sneezing, sore throat, tinnitus, trouble swallowing and voice change.    Eyes:  Negative for photophobia, pain, discharge, redness, itching and visual disturbance.   Respiratory:  Negative for apnea, cough, choking, chest tightness, shortness of breath, wheezing and stridor.    Cardiovascular:  Negative for chest pain, palpitations and leg swelling.   Gastrointestinal:  Negative for abdominal distention, abdominal pain, anal bleeding,  "blood in stool, constipation, diarrhea, nausea, rectal pain and vomiting.   Endocrine: Negative for cold intolerance, heat intolerance, polydipsia, polyphagia and polyuria.   Genitourinary:  Negative for decreased urine volume, difficulty urinating, dysuria, enuresis, flank pain, frequency, genital sores, hematuria and urgency.   Musculoskeletal:  Negative for arthralgias, back pain, gait problem, joint swelling, myalgias, neck pain and neck stiffness.   Skin:  Negative for color change, pallor, rash and wound.   Allergic/Immunologic: Negative for environmental allergies, food allergies and immunocompromised state.   Neurological:  Negative for dizziness, tremors, seizures, syncope, facial asymmetry, speech difficulty, weakness, light-headedness, numbness and headaches.   Hematological:  Negative for adenopathy. Does not bruise/bleed easily.   Psychiatric/Behavioral:  Negative for agitation, behavioral problems, confusion, decreased concentration, dysphoric mood, hallucinations, self-injury, sleep disturbance and suicidal ideas. The patient is not nervous/anxious and is not hyperactive.        Objective   Temperature 97.7 °F (36.5 °C), temperature source Temporal, height 177.8 cm (70\"), weight 100 kg (221 lb 4.8 oz).  Body mass index is 31.75 kg/m².    Physical Exam    Assessment & Plan     Independent Review of Radiographic Studies:      Available for my review is a noncontrast head CT of the brain which was performed earlier today.  The previously visualized right holohemispheric subdural hematoma has completely resolved.  There is no evidence of contralateral subdural hematoma.    Medical Decision Making:      I have referred him to neurology to wean him off of his Keppra.  He likely will need an EEG.  From my standpoint, the risk of seizure is low although he did have a seizure when he was a teenager, so he may have a slightly lowered seizure threshold overall.  I reviewed the signs and symptoms of cortical " irritation and intracranial mass effect with the patient.  I directed him to contact my office with new or worsening symptoms.  I will follow-up with him on an as-needed basis moving forward.    Diagnoses and all orders for this visit:    1. Seizure (Primary)  -     Ambulatory Referral to Neurology        No follow-ups on file.           This document signed by CHARLA Jenkins MD January 29, 2025 13:11 EST

## 2025-01-30 ENCOUNTER — TELEPHONE (OUTPATIENT)
Dept: NEUROSURGERY | Facility: CLINIC | Age: 70
End: 2025-01-30
Payer: MEDICARE

## 2025-01-30 NOTE — TELEPHONE ENCOUNTER
Provider:  Gregory  Surgery/Procedure:  [place] hole for right subdural hematoma   Surgery/Procedure Date:  11/5/24  Last visit:   1/29/25  Next visit: NA     Reason for call:  Dianelys VALDEZ with [facility] called to inquire if Mr. Thomas is able to resume antiplatelet therapy? She stated they will begin with aspirin first, then Plavix at a later time. Patients most recent CT of the brain shows resolution of subdural hematoma - CT Head Without Contrast (01/29/2025 10:07).

## 2025-05-03 NOTE — ED PROVIDER NOTES
Subjective   History of Present Illness  69-year-old male with history of CAD, hypertension, hyperlipidemia presenting with headache x 3 weeks.  Patient states that his headache gets worse when he coughs or sneezes.  He has intermittent episodes of weakness numbness and tingling.  The family says he has episodes of strokelike symptoms however they quickly resolved.  Today's headache was worse than it has been and he was having numbness and weakness of his lower extremity.  Currently at bedside patient is NIH of 0 ANO x 4 following all commands.  States that his head only hurts if he coughs or sneezes.  Patient takes aspirin Plavix.      Review of Systems   Constitutional:  Negative for fever.   Respiratory: Negative.  Negative for apnea, cough, choking, chest tightness, shortness of breath, wheezing and stridor.    Cardiovascular: Negative.  Negative for chest pain.   Gastrointestinal: Negative.  Negative for abdominal pain.   Endocrine: Negative.    Genitourinary: Negative.  Negative for dysuria.   Skin: Negative.    Neurological:  Positive for weakness, numbness and headaches. Negative for dizziness, tremors, seizures, syncope, facial asymmetry, speech difficulty and light-headedness.   Psychiatric/Behavioral: Negative.     All other systems reviewed and are negative.      History reviewed. No pertinent past medical history.    Allergies   Allergen Reactions    Codeine Nausea Only     Other Reaction(s) from [facility]: Nausea    Phenobarbital Itching       History reviewed. No pertinent surgical history.    History reviewed. No pertinent family history.    Social History     Socioeconomic History    Marital status:    Tobacco Use    Smoking status: Never    Smokeless tobacco: Never   Vaping Use    Vaping status: Never Used   Substance and Sexual Activity    Alcohol use: Never    Drug use: Never    Sexual activity: Defer           Objective   Physical Exam  Vitals and nursing note reviewed.    Constitutional:       General: He is not in acute distress.     Appearance: He is well-developed. He is not diaphoretic.   HENT:      Head: Normocephalic and atraumatic.      Right Ear: External ear normal.      Left Ear: External ear normal.      Nose: Nose normal.   Eyes:      Conjunctiva/sclera: Conjunctivae normal.      Pupils: Pupils are equal, round, and reactive to light.   Neck:      Vascular: No JVD.      Trachea: No tracheal deviation.   Cardiovascular:      Rate and Rhythm: Normal rate and regular rhythm.      Heart sounds: Normal heart sounds. No murmur heard.  Pulmonary:      Effort: Pulmonary effort is normal. No respiratory distress.      Breath sounds: Normal breath sounds. No wheezing.   Abdominal:      General: Bowel sounds are normal.      Palpations: Abdomen is soft.      Tenderness: There is no abdominal tenderness.   Musculoskeletal:         General: No deformity. Normal range of motion.      Cervical back: Normal range of motion and neck supple.   Skin:     General: Skin is warm and dry.      Coloration: Skin is not pale.      Findings: No erythema or rash.   Neurological:      General: No focal deficit present.      Mental Status: He is alert and oriented to person, place, and time.      Cranial Nerves: No cranial nerve deficit.      Sensory: No sensory deficit.      Motor: No weakness.      Coordination: Coordination normal.      Gait: Gait normal.      Deep Tendon Reflexes: Reflexes normal.   Psychiatric:         Behavior: Behavior normal.         Thought Content: Thought content normal.         Procedures           ED Course  ED Course as of 10/23/24 2340   Wed Oct 23, 2024   1735 EKG interpretation sinus rhythm with occasional PVCs 74 bpm QTc is 430 no ST elevation or depression.  Electronically signed by David Serrano DO, 10/23/24, 5:35 PM EDT.   [GF]      ED Course User Index  [GF] David Serrano DO                                               Medical Decision Making  No  focal neurological deficits.  Patient remains NIH of 0.  Complaining of headache.  His CT scan showed a subdural hematoma.  Case was immediately discussed with our telemetry neuro Dr. Rawls it was also discussed with our radiology.  We presented the case to [facility] Dr. Jenkins the neurosurgeon.  It was decided patient to be transferred back to [facility] for intervention at this time.  Discussed the case then with their hospitalist team Dr. Constantino who agrees with transfer at this time.  Discussed with patient's family at bedtime the plan of management all questions answered.    Amount and/or Complexity of Data Reviewed  Labs: ordered.  Radiology: ordered.        Final diagnoses:   Subdural hemorrhage       ED Disposition  ED Disposition       ED Disposition   Transfer to Another Facility     Condition   --    Comment   --               No follow-up provider specified.       Medication List      No changes were made to your prescriptions during this visit.            David Serrano, DO  10/23/24 2034       David Serrano, DO  10/23/24 5170     Statement Selected

## (undated) DEVICE — GUIDEWIRE WITH ICE™ HYDROPHILIC COATING: Brand: TRANSEND™ EX

## (undated) DEVICE — SURGUARD3 SAFETY HYPODERMIC NEEDLE: Brand: SURGUARD3

## (undated) DEVICE — GLV SURG SENSICARE PI MIC PF SZ6.5 LF STRL

## (undated) DEVICE — GLV SURG DERMASSURE GRN LF PF SZ 6.5

## (undated) DEVICE — RADIFOCUS GLIDEWIRE: Brand: GLIDEWIRE

## (undated) DEVICE — STPCK 3/WY HP M/RA W/OFF/HNDL 1050PSI STRL

## (undated) DEVICE — LN INJ CONTRST FLXCIL HP F/M LL 1200PSI48

## (undated) DEVICE — PENCL ROCKRSWCH MEGADYNE W/HOLSTR 10FT SS

## (undated) DEVICE — CONN TBG Y 5 IN 1 LF STRL

## (undated) DEVICE — STRAP POSTN KN/BDY FM 5X72IN DISP

## (undated) DEVICE — DETACHMENT SYSTEM: Brand: INZONE

## (undated) DEVICE — GW STARTER ROSEN PTFE/COAT J/TP/1.5MM 0.035IN 3X260CM

## (undated) DEVICE — CATH IV INSYTE AUTOGARD 14G 1 1/2IN ORNG

## (undated) DEVICE — CATH GUIDE ENVOY MPD 5F0.056IN 90CM

## (undated) DEVICE — RADIFOCUS TORQUE DEVICE MULTI-TORQUE VISE: Brand: RADIFOCUS TORQUE DEVICE

## (undated) DEVICE — CATH MIC RENEGADE 2MARK 3F 10X150CM

## (undated) DEVICE — ANGIO-SEAL VIP VASCULAR CLOSURE DEVICE: Brand: ANGIO-SEAL

## (undated) DEVICE — SUT NUROLON 4/0 TF18 CR8 I8IN C584D

## (undated) DEVICE — GLV SURG BIOGEL LTX PF 8

## (undated) DEVICE — GW TRANSEND.010 .010IN 2X205CM

## (undated) DEVICE — SUT VICYL 2/0 CR8 18IN DYED J726D

## (undated) DEVICE — LIMB HOLDER, WRIST/ANKLE: Brand: DEROYAL

## (undated) DEVICE — UNDERGLV SURG BIOGEL INDICAT PI SZ8.5 BLU

## (undated) DEVICE — ADHS SKIN PREMIERPRO EXOFIN TOPICAL HI/VISC .5ML

## (undated) DEVICE — SYR CONTRL LUERLOK 10CC

## (undated) DEVICE — INTRO SHEATH ART/FEM ENGAGE .038 5F12CM

## (undated) DEVICE — LEX NEURO ANGIOGRAPHY: Brand: MEDLINE INDUSTRIES, INC.

## (undated) DEVICE — CATH TEMPO 5F BER 100CM: Brand: TEMPO

## (undated) DEVICE — CVR HNDL LIGHT RIGID

## (undated) DEVICE — ROTATING HEMOSTATIC VALVE .096": Brand: RHV